# Patient Record
Sex: FEMALE | Race: WHITE | NOT HISPANIC OR LATINO | URBAN - METROPOLITAN AREA
[De-identification: names, ages, dates, MRNs, and addresses within clinical notes are randomized per-mention and may not be internally consistent; named-entity substitution may affect disease eponyms.]

---

## 2017-10-10 ENCOUNTER — APPOINTMENT (OUTPATIENT)
Dept: URBAN - METROPOLITAN AREA CLINIC 200 | Age: 62
Setting detail: DERMATOLOGY
End: 2017-10-15

## 2017-10-10 DIAGNOSIS — L57.8 OTHER SKIN CHANGES DUE TO CHRONIC EXPOSURE TO NONIONIZING RADIATION: ICD-10-CM

## 2017-10-10 DIAGNOSIS — L57.0 ACTINIC KERATOSIS: ICD-10-CM

## 2017-10-10 DIAGNOSIS — D22 MELANOCYTIC NEVI: ICD-10-CM

## 2017-10-10 PROBLEM — D22.5 MELANOCYTIC NEVI OF TRUNK: Status: ACTIVE | Noted: 2017-10-10

## 2017-10-10 PROBLEM — J30.1 ALLERGIC RHINITIS DUE TO POLLEN: Status: ACTIVE | Noted: 2017-10-10

## 2017-10-10 PROCEDURE — OTHER LIQUID NITROGEN: OTHER

## 2017-10-10 PROCEDURE — OTHER COUNSELING: OTHER

## 2017-10-10 PROCEDURE — 99202 OFFICE O/P NEW SF 15 MIN: CPT | Mod: 25

## 2017-10-10 PROCEDURE — 17000 DESTRUCT PREMALG LESION: CPT

## 2017-10-10 ASSESSMENT — LOCATION ZONE DERM: LOCATION ZONE: TRUNK

## 2017-10-10 ASSESSMENT — LOCATION SIMPLE DESCRIPTION DERM: LOCATION SIMPLE: CHEST

## 2017-10-10 ASSESSMENT — LOCATION DETAILED DESCRIPTION DERM
LOCATION DETAILED: UPPER STERNUM
LOCATION DETAILED: MIDDLE STERNUM

## 2017-10-10 NOTE — PROCEDURE: LIQUID NITROGEN
Consent: The patient's consent was obtained including but not limited to risks of crusting, scabbing, blistering, scarring, darker or lighter pigmentary change, recurrence, incomplete removal and infection.
Number Of Freeze-Thaw Cycles: 2 freeze-thaw cycles
Duration Of Freeze Thaw-Cycle (Seconds): 10
Render Post-Care Instructions In Note?: no
Detail Level: Detailed
Post-Care Instructions: I reviewed with the patient in detail post-care instructions. Patient is to wear sunprotection, and avoid picking at any of the treated lesions. Pt may apply Vaseline to crusted or scabbing areas.

## 2018-10-19 ENCOUNTER — APPOINTMENT (OUTPATIENT)
Dept: URBAN - METROPOLITAN AREA CLINIC 200 | Age: 63
Setting detail: DERMATOLOGY
End: 2018-10-19

## 2018-10-19 ENCOUNTER — RX ONLY (RX ONLY)
Age: 63
End: 2018-10-19

## 2018-10-19 DIAGNOSIS — L57.8 OTHER SKIN CHANGES DUE TO CHRONIC EXPOSURE TO NONIONIZING RADIATION: ICD-10-CM

## 2018-10-19 DIAGNOSIS — L20.89 OTHER ATOPIC DERMATITIS: ICD-10-CM

## 2018-10-19 PROCEDURE — OTHER COUNSELING: OTHER

## 2018-10-19 PROCEDURE — OTHER MIPS QUALITY: OTHER

## 2018-10-19 PROCEDURE — 99213 OFFICE O/P EST LOW 20 MIN: CPT

## 2018-10-19 PROCEDURE — OTHER PRESCRIPTION: OTHER

## 2018-10-19 RX ORDER — TRIAMCINOLONE ACETONIDE 1 MG/G
CREAM TOPICAL
Qty: 1 | Refills: 5 | Status: ERX

## 2018-10-19 ASSESSMENT — LOCATION DETAILED DESCRIPTION DERM
LOCATION DETAILED: RIGHT ANTERIOR SHOULDER
LOCATION DETAILED: LEFT MEDIAL SUPERIOR CHEST

## 2018-10-19 ASSESSMENT — LOCATION SIMPLE DESCRIPTION DERM
LOCATION SIMPLE: RIGHT SHOULDER
LOCATION SIMPLE: CHEST

## 2018-10-19 ASSESSMENT — LOCATION ZONE DERM
LOCATION ZONE: ARM
LOCATION ZONE: TRUNK

## 2018-10-19 NOTE — PROCEDURE: MIPS QUALITY
Additional Notes: Patient has not received flu shot, but plans to.
Detail Level: Detailed
Quality 110: Preventive Care And Screening: Influenza Immunization: Influenza Immunization not Administered for Documented Reasons.

## 2019-06-07 ENCOUNTER — APPOINTMENT (EMERGENCY)
Dept: RADIOLOGY | Facility: HOSPITAL | Age: 64
End: 2019-06-07
Attending: EMERGENCY MEDICINE
Payer: COMMERCIAL

## 2019-06-07 ENCOUNTER — HOSPITAL ENCOUNTER (EMERGENCY)
Facility: HOSPITAL | Age: 64
Discharge: HOME | End: 2019-06-07
Attending: EMERGENCY MEDICINE
Payer: COMMERCIAL

## 2019-06-07 VITALS
SYSTOLIC BLOOD PRESSURE: 151 MMHG | TEMPERATURE: 98.5 F | WEIGHT: 205 LBS | RESPIRATION RATE: 16 BRPM | HEART RATE: 71 BPM | DIASTOLIC BLOOD PRESSURE: 87 MMHG | HEIGHT: 64 IN | BODY MASS INDEX: 35 KG/M2 | OXYGEN SATURATION: 99 %

## 2019-06-07 DIAGNOSIS — S09.90XA CLOSED HEAD INJURY WITHOUT LOSS OF CONSCIOUSNESS, INITIAL ENCOUNTER: Primary | ICD-10-CM

## 2019-06-07 DIAGNOSIS — S00.10XA PERIORBITAL ECCHYMOSIS, UNSPECIFIED LATERALITY, INITIAL ENCOUNTER: ICD-10-CM

## 2019-06-07 DIAGNOSIS — S00.83XA TRAUMATIC HEMATOMA OF FOREHEAD, INITIAL ENCOUNTER: ICD-10-CM

## 2019-06-07 PROCEDURE — 99284 EMERGENCY DEPT VISIT MOD MDM: CPT | Mod: 25

## 2019-06-07 PROCEDURE — 70450 CT HEAD/BRAIN W/O DYE: CPT

## 2019-06-07 RX ORDER — NAPROXEN SODIUM 220 MG/1
81 TABLET, FILM COATED ORAL DAILY
COMMUNITY

## 2019-06-07 RX ORDER — HYDROGEN PEROXIDE 3 %
20 SOLUTION, NON-ORAL MISCELLANEOUS
COMMUNITY

## 2019-06-07 RX ORDER — MONTELUKAST SODIUM 10 MG/1
TABLET ORAL NIGHTLY
COMMUNITY

## 2019-06-07 RX ORDER — ALBUTEROL SULFATE 90 UG/1
2 INHALANT RESPIRATORY (INHALATION) EVERY 4 HOURS PRN
Refills: 6 | COMMUNITY
Start: 2019-03-13

## 2019-06-07 RX ORDER — LUBIPROSTONE 24 UG/1
CAPSULE ORAL
COMMUNITY
Start: 2007-04-23

## 2019-06-07 RX ORDER — HYDROCHLOROTHIAZIDE 12.5 MG/1
12.5 CAPSULE ORAL DAILY
Status: ON HOLD | COMMUNITY
End: 2024-04-19

## 2019-06-07 RX ORDER — CEVIMELINE HYDROCHLORIDE 30 MG/1
30 CAPSULE ORAL 2 TIMES DAILY
COMMUNITY

## 2019-06-07 RX ORDER — METOPROLOL TARTRATE 25 MG/1
75 TABLET, FILM COATED ORAL 2 TIMES DAILY
COMMUNITY

## 2019-06-07 RX ORDER — LEVOCETIRIZINE DIHYDROCHLORIDE 5 MG/1
5 TABLET, FILM COATED ORAL
COMMUNITY

## 2019-06-07 RX ORDER — INSULIN PUMP SYRINGE, 3 ML
EACH MISCELLANEOUS
COMMUNITY

## 2019-06-07 RX ORDER — TRAMADOL HYDROCHLORIDE 50 MG/1
50 TABLET ORAL EVERY 6 HOURS PRN
Status: ON HOLD | COMMUNITY
End: 2024-04-19

## 2019-06-07 RX ORDER — SIMVASTATIN 40 MG/1
40 TABLET, FILM COATED ORAL NIGHTLY
COMMUNITY

## 2019-06-07 RX ORDER — FLUOXETINE 10 MG/1
60 CAPSULE ORAL DAILY
COMMUNITY

## 2019-06-07 RX ORDER — HYDROXYCHLOROQUINE SULFATE 200 MG/1
TABLET, FILM COATED ORAL DAILY
Status: ON HOLD | COMMUNITY
End: 2024-04-19

## 2019-06-07 ASSESSMENT — ENCOUNTER SYMPTOMS
NUMBNESS: 0
SHORTNESS OF BREATH: 0
NECK PAIN: 0
FEVER: 0
VOMITING: 0
HEADACHES: 0
EYE PAIN: 0
DIZZINESS: 0
WEAKNESS: 0

## 2019-06-07 NOTE — ED ATTESTATION NOTE
The patient was evaluated and managed by the physician assistant / nurse practitioner.       Eobny Desouza, DO  06/07/19 1285

## 2019-06-07 NOTE — DISCHARGE INSTRUCTIONS
Ice area if able. The bruising may continue to spread over the next week. Return to the  ER for severe pain, vision changes, vomiting, pass out, difficulty walking or talking, or any other concerning symptoms.

## 2019-06-07 NOTE — ED PROVIDER NOTES
"HPI     Chief Complaint   Patient presents with   • Fall           ** 65 y/o female presents for evaluation of periorbital ecchymosis s/p fall that occurred 2 days ago. Two days ago, patient was preparing the house for a move when she tripped over a box, falling and landing onto face. No LOC, was able to get up immediately. States she immediately had a hematoma to her forehead. States yesterday, her lower forehead was \"puffy\". Awoke this morning with significant bruising around both eyes, prompting her to come to ED. Denies headache (other than soreness at area of hematoma), neck pain, vision changes, periorbital pain, vomiting, dizziness, paresthesias, or weakness. Patient is on baby ASA daily. **             Patient History     Past Medical History:   Diagnosis Date   • Asthma    • Fibromyalgia    • GERD (gastroesophageal reflux disease)    • Hypertension    • Osteoarthritis    • Scleroma        Past Surgical History:   Procedure Laterality Date   • BACK SURGERY     • CATARACT EXTRACTION      b/l   • NECK SURGERY      bar in neck   • REPLACEMENT TOTAL KNEE     • ROTATOR CUFF REPAIR      b/l       History reviewed. No pertinent family history.    Social History   Substance Use Topics   • Smoking status: Never Smoker   • Smokeless tobacco: Never Used   • Alcohol use No       Systems Reviewed from Nursing Triage:          Review of Systems     Review of Systems   Constitutional: Negative for fever.   Eyes: Negative for pain and visual disturbance.   Respiratory: Negative for shortness of breath.    Cardiovascular: Negative for chest pain.   Gastrointestinal: Negative for vomiting.   Musculoskeletal: Negative for neck pain.   Neurological: Negative for dizziness, syncope, weakness, numbness and headaches.        Physical Exam     ED Triage Vitals [06/07/19 1353]   Temp Heart Rate Resp BP SpO2   36.9 °C (98.5 °F) 71 18 (!) 147/62 100 %      Temp Source Heart Rate Source Patient Position BP Location FiO2 (%) (Set) " "  Oral -- Sitting Right upper arm --                     Patient Vitals for the past 24 hrs:   BP Temp Temp src Pulse Resp SpO2 Height Weight   06/07/19 1353 (!) 147/62 36.9 °C (98.5 °F) Oral 71 18 100 % 1.626 m (5' 4\") 93 kg (205 lb)           Physical Exam   Constitutional: She is oriented to person, place, and time. She appears well-developed and well-nourished.   HENT:   Head: Normocephalic.   Large left frontal hematoma  Periorbital hematoma/ecchymosis bilaterally diffusely  No facial bone tenderness   Eyes: Pupils are equal, round, and reactive to light. Conjunctivae and EOM are normal.   Neck: Neck supple.   No tenderness   Cardiovascular: Normal rate, regular rhythm and normal heart sounds.    Pulmonary/Chest: Effort normal and breath sounds normal.   Neurological: She is alert and oriented to person, place, and time.   No sensory or motor deficits   Skin: Skin is warm and dry.   Psychiatric: She has a normal mood and affect.   Nursing note and vitals reviewed.           Procedures    ED Course & MDM     Labs Reviewed - No data to display    CT HEAD WITHOUT IV CONTRAST   Final Result   IMPRESSION:         1.  No acute intracranial abnormality.   2.  Frontal scalp hematoma.                  MDM         ED Course as of Jun 07 1452 Fri Jun 07, 2019   1405 Normal VS. Well-appearing. Will check CT head to r/o intracranial injury. I do not suspect C-spine or facial bone fractures.  [TT]   1451 Patient doing well. No intracranial injury seen on CT. Will recommend ice and close monitoring. Will recommend close follow-up with PCP.  [TT]      ED Course User Index  [TT] Yulissa Louis PA C         Clinical Impressions as of Jun 07 1452   Closed head injury without loss of consciousness, initial encounter   Traumatic hematoma of forehead, initial encounter   Periorbital ecchymosis, unspecified laterality, initial encounter - bilateral        Yulissa Louis PA C  06/07/19 1452    "

## 2019-10-23 ENCOUNTER — APPOINTMENT (OUTPATIENT)
Dept: URBAN - METROPOLITAN AREA CLINIC 200 | Age: 64
Setting detail: DERMATOLOGY
End: 2019-10-25

## 2019-10-23 DIAGNOSIS — L57.8 OTHER SKIN CHANGES DUE TO CHRONIC EXPOSURE TO NONIONIZING RADIATION: ICD-10-CM

## 2019-10-23 PROBLEM — D23.5 OTHER BENIGN NEOPLASM OF SKIN OF TRUNK: Status: ACTIVE | Noted: 2019-10-23

## 2019-10-23 PROCEDURE — OTHER MIPS QUALITY: OTHER

## 2019-10-23 PROCEDURE — OTHER COUNSELING: OTHER

## 2019-10-23 PROCEDURE — 99213 OFFICE O/P EST LOW 20 MIN: CPT

## 2019-10-23 ASSESSMENT — LOCATION DETAILED DESCRIPTION DERM: LOCATION DETAILED: LEFT MEDIAL SUPERIOR CHEST

## 2019-10-23 ASSESSMENT — LOCATION SIMPLE DESCRIPTION DERM: LOCATION SIMPLE: CHEST

## 2019-10-23 ASSESSMENT — LOCATION ZONE DERM: LOCATION ZONE: TRUNK

## 2019-10-23 NOTE — PROCEDURE: MIPS QUALITY
Detail Level: Detailed
Quality 474: Zoster Vaccination Status: Shingrix Vaccination Administered or Previously Received
Quality 110: Preventive Care And Screening: Influenza Immunization: Influenza Immunization previously received during influenza season

## 2021-05-06 ENCOUNTER — APPOINTMENT (OUTPATIENT)
Dept: URBAN - METROPOLITAN AREA CLINIC 200 | Age: 66
Setting detail: DERMATOLOGY
End: 2021-05-06

## 2021-05-06 ENCOUNTER — APPOINTMENT (OUTPATIENT)
Dept: URBAN - METROPOLITAN AREA CLINIC 200 | Age: 66
Setting detail: DERMATOLOGY
End: 2021-05-13

## 2021-05-06 DIAGNOSIS — L57.0 ACTINIC KERATOSIS: ICD-10-CM

## 2021-05-06 DIAGNOSIS — L82.1 OTHER SEBORRHEIC KERATOSIS: ICD-10-CM

## 2021-05-06 DIAGNOSIS — L57.8 OTHER SKIN CHANGES DUE TO CHRONIC EXPOSURE TO NONIONIZING RADIATION: ICD-10-CM

## 2021-05-06 PROCEDURE — OTHER REASSURANCE: OTHER

## 2021-05-06 PROCEDURE — OTHER COUNSELING: OTHER

## 2021-05-06 PROCEDURE — 17000 DESTRUCT PREMALG LESION: CPT

## 2021-05-06 PROCEDURE — OTHER LIQUID NITROGEN: OTHER

## 2021-05-06 PROCEDURE — 99212 OFFICE O/P EST SF 10 MIN: CPT | Mod: 25

## 2021-05-06 ASSESSMENT — LOCATION ZONE DERM
LOCATION ZONE: TRUNK
LOCATION ZONE: FACE
LOCATION ZONE: ARM

## 2021-05-06 ASSESSMENT — LOCATION SIMPLE DESCRIPTION DERM
LOCATION SIMPLE: CHEST
LOCATION SIMPLE: LEFT FOREHEAD
LOCATION SIMPLE: RIGHT FOREARM

## 2021-05-06 ASSESSMENT — LOCATION DETAILED DESCRIPTION DERM
LOCATION DETAILED: LEFT MEDIAL FOREHEAD
LOCATION DETAILED: RIGHT DISTAL DORSAL FOREARM
LOCATION DETAILED: LEFT MEDIAL SUPERIOR CHEST

## 2021-05-06 ASSESSMENT — PAIN INTENSITY VAS: HOW INTENSE IS YOUR PAIN 0 BEING NO PAIN, 10 BEING THE MOST SEVERE PAIN POSSIBLE?: NO PAIN

## 2021-05-06 NOTE — PROCEDURE: LIQUID NITROGEN
Post-Care Instructions: I reviewed with the patient in detail post-care instructions. Patient is to wear sunprotection, and avoid picking at any of the treated lesions. Pt may apply Vaseline to crusted or scabbing areas.
Detail Level: Detailed
Duration Of Freeze Thaw-Cycle (Seconds): 2
Render Note In Bullet Format When Appropriate: No
Number Of Freeze-Thaw Cycles: 1 freeze-thaw cycle
Consent: The patient's consent was obtained including but not limited to risks of crusting, scabbing, blistering, scarring, darker or lighter pigmentary change, recurrence, incomplete removal and infection.

## 2022-06-13 ENCOUNTER — APPOINTMENT (OUTPATIENT)
Dept: URBAN - METROPOLITAN AREA CLINIC 200 | Age: 67
Setting detail: DERMATOLOGY
End: 2022-06-20

## 2022-06-13 DIAGNOSIS — D485 NEOPLASM OF UNCERTAIN BEHAVIOR OF SKIN: ICD-10-CM

## 2022-06-13 DIAGNOSIS — Z11.52 ENCOUNTER FOR SCREENING FOR COVID-19: ICD-10-CM

## 2022-06-13 DIAGNOSIS — L57.8 OTHER SKIN CHANGES DUE TO CHRONIC EXPOSURE TO NONIONIZING RADIATION: ICD-10-CM

## 2022-06-13 DIAGNOSIS — L82.1 OTHER SEBORRHEIC KERATOSIS: ICD-10-CM

## 2022-06-13 DIAGNOSIS — R20.2 PARESTHESIA OF SKIN: ICD-10-CM

## 2022-06-13 PROBLEM — D48.5 NEOPLASM OF UNCERTAIN BEHAVIOR OF SKIN: Status: ACTIVE | Noted: 2022-06-13

## 2022-06-13 PROCEDURE — OTHER SUNSCREEN RECOMMENDATIONS: OTHER

## 2022-06-13 PROCEDURE — OTHER COUNSELING: OTHER

## 2022-06-13 PROCEDURE — OTHER SCREENING FOR COVID-19: OTHER

## 2022-06-13 PROCEDURE — 11102 TANGNTL BX SKIN SINGLE LES: CPT

## 2022-06-13 PROCEDURE — OTHER BIOPSY BY SHAVE METHOD: OTHER

## 2022-06-13 PROCEDURE — 99213 OFFICE O/P EST LOW 20 MIN: CPT | Mod: 25

## 2022-06-13 PROCEDURE — OTHER REASSURANCE: OTHER

## 2022-06-13 ASSESSMENT — LOCATION ZONE DERM
LOCATION ZONE: ARM
LOCATION ZONE: TRUNK
LOCATION ZONE: FACE

## 2022-06-13 ASSESSMENT — PAIN INTENSITY VAS: HOW INTENSE IS YOUR PAIN 0 BEING NO PAIN, 10 BEING THE MOST SEVERE PAIN POSSIBLE?: NO PAIN

## 2022-06-13 ASSESSMENT — LOCATION SIMPLE DESCRIPTION DERM
LOCATION SIMPLE: RIGHT FOREARM
LOCATION SIMPLE: LEFT BREAST
LOCATION SIMPLE: ABDOMEN
LOCATION SIMPLE: LEFT FOREHEAD
LOCATION SIMPLE: RIGHT UPPER BACK

## 2022-06-13 ASSESSMENT — LOCATION DETAILED DESCRIPTION DERM
LOCATION DETAILED: EPIGASTRIC SKIN
LOCATION DETAILED: RIGHT SUPERIOR UPPER BACK
LOCATION DETAILED: RIGHT PROXIMAL DORSAL FOREARM
LOCATION DETAILED: LEFT MEDIAL BREAST 10-11:00 REGION
LOCATION DETAILED: LEFT MEDIAL BREAST 11-12:00 REGION
LOCATION DETAILED: LEFT MEDIAL FOREHEAD

## 2022-06-27 ENCOUNTER — APPOINTMENT (OUTPATIENT)
Dept: URBAN - METROPOLITAN AREA CLINIC 203 | Age: 67
Setting detail: DERMATOLOGY
End: 2022-06-27

## 2022-06-27 DIAGNOSIS — L57.0 ACTINIC KERATOSIS: ICD-10-CM

## 2022-06-27 DIAGNOSIS — Z11.52 ENCOUNTER FOR SCREENING FOR COVID-19: ICD-10-CM

## 2022-06-27 PROCEDURE — OTHER LIQUID NITROGEN: OTHER

## 2022-06-27 PROCEDURE — OTHER SCREENING FOR COVID-19: OTHER

## 2022-06-27 PROCEDURE — 17000 DESTRUCT PREMALG LESION: CPT

## 2022-06-27 ASSESSMENT — LOCATION DETAILED DESCRIPTION DERM
LOCATION DETAILED: LEFT MEDIAL FOREHEAD
LOCATION DETAILED: EPIGASTRIC SKIN

## 2022-06-27 ASSESSMENT — LOCATION SIMPLE DESCRIPTION DERM
LOCATION SIMPLE: ABDOMEN
LOCATION SIMPLE: LEFT FOREHEAD

## 2022-06-27 ASSESSMENT — LOCATION ZONE DERM
LOCATION ZONE: FACE
LOCATION ZONE: TRUNK

## 2022-06-27 NOTE — PROCEDURE: LIQUID NITROGEN
Consent: The patient's consent was obtained including but not limited to risks of crusting, scabbing, blistering, scarring, darker or lighter pigmentary change, recurrence, incomplete removal and infection.
Detail Level: Detailed
Show Applicator Variable?: Yes
Duration Of Freeze Thaw-Cycle (Seconds): 0
Render Note In Bullet Format When Appropriate: No
Post-Care Instructions: I reviewed with the patient in detail post-care instructions. Patient is to wear sunprotection, and avoid picking at any of the treated lesions. Pt may apply Vaseline to crusted or scabbing areas.

## 2023-03-31 ENCOUNTER — TRANSCRIBE ORDERS (OUTPATIENT)
Dept: SCHEDULING | Age: 68
End: 2023-03-31

## 2023-03-31 DIAGNOSIS — I35.8 OTHER NONRHEUMATIC AORTIC VALVE DISORDERS: ICD-10-CM

## 2023-03-31 DIAGNOSIS — I10 ESSENTIAL (PRIMARY) HYPERTENSION: ICD-10-CM

## 2023-03-31 DIAGNOSIS — Z01.818 ENCOUNTER FOR OTHER PREPROCEDURAL EXAMINATION: Primary | ICD-10-CM

## 2023-03-31 DIAGNOSIS — E66.01 MORBID (SEVERE) OBESITY DUE TO EXCESS CALORIES (CMS/HCC): ICD-10-CM

## 2023-03-31 DIAGNOSIS — E78.5 HYPERLIPIDEMIA, UNSPECIFIED: ICD-10-CM

## 2023-03-31 DIAGNOSIS — I25.10 ATHEROSCLEROTIC HEART DISEASE OF NATIVE CORONARY ARTERY WITHOUT ANGINA PECTORIS: ICD-10-CM

## 2023-03-31 DIAGNOSIS — I35.0 NONRHEUMATIC AORTIC (VALVE) STENOSIS: ICD-10-CM

## 2023-04-05 ENCOUNTER — TRANSCRIBE ORDERS (OUTPATIENT)
Dept: SCHEDULING | Age: 68
End: 2023-04-05

## 2023-04-05 DIAGNOSIS — R06.09 OTHER FORMS OF DYSPNEA: Primary | ICD-10-CM

## 2023-04-05 DIAGNOSIS — I25.10 ATHEROSCLEROTIC HEART DISEASE OF NATIVE CORONARY ARTERY WITHOUT ANGINA PECTORIS: ICD-10-CM

## 2023-04-05 DIAGNOSIS — E78.5 HYPERLIPIDEMIA, UNSPECIFIED: ICD-10-CM

## 2023-04-05 DIAGNOSIS — I10 ESSENTIAL (PRIMARY) HYPERTENSION: ICD-10-CM

## 2023-04-05 DIAGNOSIS — R94.39 ABNORMAL RESULT OF OTHER CARDIOVASCULAR FUNCTION STUDY: ICD-10-CM

## 2023-04-05 DIAGNOSIS — Z01.818 ENCOUNTER FOR OTHER PREPROCEDURAL EXAMINATION: ICD-10-CM

## 2023-04-05 DIAGNOSIS — R94.31 ABNORMAL ELECTROCARDIOGRAM (ECG) (EKG): ICD-10-CM

## 2023-04-05 DIAGNOSIS — E66.01 MORBID (SEVERE) OBESITY DUE TO EXCESS CALORIES (CMS/HCC): ICD-10-CM

## 2023-05-28 NOTE — PROCEDURE: BIOPSY BY SHAVE METHOD
Resume antihypertensives  Diabetic diet  Telemetry  Cardiology following   Type Of Destruction Used: Curettage

## 2023-06-13 ENCOUNTER — APPOINTMENT (OUTPATIENT)
Dept: URBAN - METROPOLITAN AREA CLINIC 203 | Age: 68
Setting detail: DERMATOLOGY
End: 2023-06-21

## 2023-06-13 DIAGNOSIS — L57.8 OTHER SKIN CHANGES DUE TO CHRONIC EXPOSURE TO NONIONIZING RADIATION: ICD-10-CM

## 2023-06-13 DIAGNOSIS — L30.4 ERYTHEMA INTERTRIGO: ICD-10-CM

## 2023-06-13 PROCEDURE — OTHER PRESCRIPTION MEDICATION MANAGEMENT: OTHER

## 2023-06-13 PROCEDURE — OTHER COUNSELING: OTHER

## 2023-06-13 PROCEDURE — 99213 OFFICE O/P EST LOW 20 MIN: CPT

## 2023-06-13 PROCEDURE — OTHER SUNSCREEN RECOMMENDATIONS: OTHER

## 2023-06-13 PROCEDURE — OTHER PRESCRIPTION: OTHER

## 2023-06-13 RX ORDER — TRIAMCINOLONE ACETONIDE 1 MG/G
CREAM TOPICAL
Qty: 80 | Refills: 2 | Status: ERX | COMMUNITY
Start: 2023-06-13

## 2023-06-13 ASSESSMENT — LOCATION ZONE DERM
LOCATION ZONE: LEG
LOCATION ZONE: TRUNK

## 2023-06-13 ASSESSMENT — LOCATION SIMPLE DESCRIPTION DERM
LOCATION SIMPLE: LEFT BREAST
LOCATION SIMPLE: LEFT THIGH
LOCATION SIMPLE: GROIN

## 2023-06-13 ASSESSMENT — LOCATION DETAILED DESCRIPTION DERM
LOCATION DETAILED: LEFT MEDIAL BREAST 10-11:00 REGION
LOCATION DETAILED: LEFT SUPRAPUBIC SKIN
LOCATION DETAILED: LEFT MEDIAL BREAST 11-12:00 REGION
LOCATION DETAILED: LEFT ANTERIOR PROXIMAL THIGH

## 2023-06-13 NOTE — PROCEDURE: PRESCRIPTION MEDICATION MANAGEMENT
Detail Level: Zone
Initiate Treatment: Nystatin-pt already has from pcp\\nTriamcinolone cream
Render In Strict Bullet Format?: No

## 2024-04-03 ENCOUNTER — APPOINTMENT (OUTPATIENT)
Dept: URBAN - METROPOLITAN AREA CLINIC 203 | Age: 69
Setting detail: DERMATOLOGY
End: 2024-04-03

## 2024-04-03 DIAGNOSIS — L57.8 OTHER SKIN CHANGES DUE TO CHRONIC EXPOSURE TO NONIONIZING RADIATION: ICD-10-CM

## 2024-04-03 PROCEDURE — OTHER SUNSCREEN RECOMMENDATIONS: OTHER

## 2024-04-03 PROCEDURE — 99213 OFFICE O/P EST LOW 20 MIN: CPT

## 2024-04-03 PROCEDURE — OTHER COUNSELING: OTHER

## 2024-04-03 ASSESSMENT — LOCATION DETAILED DESCRIPTION DERM
LOCATION DETAILED: LEFT MEDIAL BREAST 10-11:00 REGION
LOCATION DETAILED: LEFT MEDIAL BREAST 11-12:00 REGION

## 2024-04-03 ASSESSMENT — LOCATION ZONE DERM: LOCATION ZONE: TRUNK

## 2024-04-03 ASSESSMENT — LOCATION SIMPLE DESCRIPTION DERM: LOCATION SIMPLE: LEFT BREAST

## 2024-04-09 ENCOUNTER — ANESTHESIA EVENT (OUTPATIENT)
Dept: OPERATING ROOM | Facility: HOSPITAL | Age: 69
Setting detail: SURGERY ADMIT
DRG: 472 | End: 2024-04-09
Payer: MEDICARE

## 2024-04-11 ENCOUNTER — APPOINTMENT (OUTPATIENT)
Dept: LAB | Facility: HOSPITAL | Age: 69
End: 2024-04-11
Attending: ORTHOPAEDIC SURGERY
Payer: MEDICARE

## 2024-04-11 ENCOUNTER — TRANSCRIBE ORDERS (OUTPATIENT)
Dept: REGISTRATION | Facility: HOSPITAL | Age: 69
End: 2024-04-11

## 2024-04-11 DIAGNOSIS — Z01.812 ENCOUNTER FOR PREPROCEDURAL LABORATORY EXAMINATION: Primary | ICD-10-CM

## 2024-04-11 DIAGNOSIS — Z01.812 ENCOUNTER FOR PREPROCEDURAL LABORATORY EXAMINATION: ICD-10-CM

## 2024-04-11 LAB
ABO + RH BLD: NORMAL
ANION GAP SERPL CALC-SCNC: 6 MEQ/L (ref 3–15)
APTT PPP: 28 SEC (ref 23–35)
BASOPHILS # BLD: 0.07 K/UL (ref 0.01–0.1)
BASOPHILS NFR BLD: 0.7 %
BLD GP AB SCN SERPL QL: NEGATIVE
BUN SERPL-MCNC: 9 MG/DL (ref 7–25)
CALCIUM SERPL-MCNC: 8.8 MG/DL (ref 8.6–10.3)
CHLORIDE SERPL-SCNC: 100 MEQ/L (ref 98–107)
CO2 SERPL-SCNC: 31 MEQ/L (ref 21–31)
CREAT SERPL-MCNC: 0.7 MG/DL (ref 0.6–1.2)
D AG BLD QL: POSITIVE
DIFFERENTIAL METHOD BLD: NORMAL
EGFRCR SERPLBLD CKD-EPI 2021: >60 ML/MIN/1.73M*2
EOSINOPHIL # BLD: 0.24 K/UL (ref 0.04–0.36)
EOSINOPHIL NFR BLD: 2.4 %
ERYTHROCYTE [DISTWIDTH] IN BLOOD BY AUTOMATED COUNT: 13.6 % (ref 11.7–14.4)
EST. AVERAGE GLUCOSE BLD GHB EST-MCNC: 114 MG/DL
GLUCOSE SERPL-MCNC: 93 MG/DL (ref 70–99)
HBA1C MFR BLD: 5.6 %
HCT VFR BLD AUTO: 37.8 % (ref 35–45)
HGB BLD-MCNC: 12.3 G/DL (ref 11.8–15.7)
IMM GRANULOCYTES # BLD AUTO: 0.06 K/UL (ref 0–0.08)
IMM GRANULOCYTES NFR BLD AUTO: 0.6 %
INR PPP: 1
LABORATORY COMMENT REPORT: NORMAL
LYMPHOCYTES # BLD: 2.07 K/UL (ref 1.2–3.5)
LYMPHOCYTES NFR BLD: 21.1 %
MCH RBC QN AUTO: 28.3 PG (ref 28–33.2)
MCHC RBC AUTO-ENTMCNC: 32.5 G/DL (ref 32.2–35.5)
MCV RBC AUTO: 87.1 FL (ref 83–98)
MONOCYTES # BLD: 0.76 K/UL (ref 0.28–0.8)
MONOCYTES NFR BLD: 7.7 %
NEUTROPHILS # BLD: 6.62 K/UL (ref 1.7–7)
NEUTS SEG NFR BLD: 67.5 %
NRBC BLD-RTO: 0 %
PDW BLD AUTO: 10 FL (ref 9.4–12.3)
PLATELET # BLD AUTO: 340 K/UL (ref 150–369)
POTASSIUM SERPL-SCNC: 4.6 MEQ/L (ref 3.5–5.1)
PROTHROMBIN TIME: 12.7 SEC (ref 12.2–14.5)
RBC # BLD AUTO: 4.34 M/UL (ref 3.93–5.22)
SODIUM SERPL-SCNC: 137 MEQ/L (ref 136–145)
SPECIMEN EXP DATE BLD: NORMAL
WBC # BLD AUTO: 9.82 K/UL (ref 3.8–10.5)

## 2024-04-11 PROCEDURE — 85025 COMPLETE CBC W/AUTO DIFF WBC: CPT | Mod: GA

## 2024-04-11 PROCEDURE — 80048 BASIC METABOLIC PNL TOTAL CA: CPT

## 2024-04-11 PROCEDURE — 85610 PROTHROMBIN TIME: CPT | Mod: GA

## 2024-04-11 PROCEDURE — 36415 COLL VENOUS BLD VENIPUNCTURE: CPT

## 2024-04-11 PROCEDURE — 85730 THROMBOPLASTIN TIME PARTIAL: CPT | Mod: GA

## 2024-04-11 PROCEDURE — 86850 RBC ANTIBODY SCREEN: CPT

## 2024-04-11 PROCEDURE — 83036 HEMOGLOBIN GLYCOSYLATED A1C: CPT | Mod: GA

## 2024-04-11 PROCEDURE — 86901 BLOOD TYPING SEROLOGIC RH(D): CPT

## 2024-04-16 ENCOUNTER — PRE-ADMISSION TESTING (OUTPATIENT)
Dept: PREADMISSION TESTING | Age: 69
End: 2024-04-16
Payer: MEDICARE

## 2024-04-16 VITALS — BODY MASS INDEX: 42.33 KG/M2 | WEIGHT: 230 LBS | HEIGHT: 62 IN

## 2024-04-16 RX ORDER — AZELASTINE 1 MG/ML
137 SPRAY, METERED NASAL 2 TIMES DAILY
COMMUNITY
Start: 2023-05-11

## 2024-04-16 RX ORDER — TRAZODONE HYDROCHLORIDE 50 MG/1
TABLET ORAL
COMMUNITY
Start: 2023-06-06

## 2024-04-16 RX ORDER — SITAGLIPTIN 50 MG/1
50 TABLET, FILM COATED ORAL NIGHTLY
COMMUNITY
Start: 2023-11-28

## 2024-04-16 RX ORDER — IPRATROPIUM BROMIDE 42 UG/1
2 SPRAY, METERED NASAL 2 TIMES DAILY
COMMUNITY
Start: 2023-07-03

## 2024-04-16 RX ORDER — SOLIFENACIN SUCCINATE 10 MG/1
10 TABLET, FILM COATED ORAL
COMMUNITY
Start: 2023-06-06

## 2024-04-16 RX ORDER — FLUTICASONE FUROATE AND VILANTEROL 100; 25 UG/1; UG/1
POWDER RESPIRATORY (INHALATION)
COMMUNITY
Start: 2023-10-06

## 2024-04-16 NOTE — PRE-PROCEDURE INSTRUCTIONS
1.       You will be called between 1pm-5pm one business day before your procedure to tell you where and when to report. If you do not receive a phone call by 6pm, please call the Operating Room at [phone].    2.        Please report to Surgery Registration on the day of your procedure. You can park in the [facility], enter the front doors of the new Pavilion, and take the [place] elevators to the second floor. Follow signs to Surgery Registration.       3. Please follow the following fasting guidelines:     No solid food EIGHT HOURS prior to arrival time on day of surgery.  6 ounces of clear liquids, meaning water or PLAIN black coffee WITHOUT any milk, cream, sugar, or sweetener are permitted up to TWO HOURS prior to arrival at the hospital.    4. Please take ONLY the following medications with a sip of water on the morning of your procedure: (populate names and/or NONE)  Nexium, prozac, metoprolol, bring in albuterol,breo ellipta, cevimeline, asteline, atrovent      5. Other Instructions: You may brush your teeth the morning of the procedure. Rinse and spit, do not swallow.  Bring a list of your medications with dosages.  Use surgical wash as directed.     6. If you develop a cold, cough, fever, rash, or other symptom prior to the day of the procedure, please report it to your physician immediately.    7. If you need to cancel the procedure for any reason, please contact your physician.    8. Make arrangements to have safe transportation home accompanied by a responsible adult. If you have not arranged safe transportation home, your surgery will be cancelled. Safe transportation may include private vehicle, ride-share service, taxi and public transportation when accompanied by a responsible adult who will assist you home. A responsible adult is someone known to you and does not include the taxi, ride-share or public transit drive transporting you.    9.  If it is medically necessary for you to have a longer  stay, you will be informed as soon as the decision is made.    10. Only bring essential items to the hospital.  Do not wear or bring anything of value to the hospital including jewelry of any kind, money, or wallet. Do not wear make-up or contact lenses.  DO NOT BRING MEDICATIONS FROM HOME unless instructed to do so. DO bring your hearing aids, glasses, and a case    11. No lotion, creams, powders, or oils on skin the morning of procedure     12. Dress in comfortable clothes.    13.  If instructed, please bring a copy of your Advanced Directive (Living Will/Durable Power of ) on the day of your procedure.     14. Ensuring your safety at all times is a very important part of our [facility] Culture of Safety. After having surgery and sedation, you are at risk for falling and balance issues. Although you may feel awake, the effects of the medication can last up to 24 hours after anesthesia. If you need to use the bathroom during your recovery period, nursing staff will escort you there and stay with you to ensure your safety.    15. Refrain from drinking alcohol and smoking cigarettes for 24 hours prior to surgery.    16. Shower with antibacterial soap (Dial) the night before and morning of your procedure.  If your procedure indicates the need for CHG antiseptic wash (Bactoshield or Hibiclens), please use this instead and follow instructions as discussed at the time of your Pre-Admission Testing phone interview or visit.    Above instructions reviewed with patient and patient acknowledges understanding.    Form explained by: Carissa Hutson RN

## 2024-04-16 NOTE — PRE-PROCEDURE INSTRUCTIONS
[facility]  Patient Education Preoperative Showers    Good hygiene, such as frequent handwashing and daily skin cleansing, promotes good health. Daily skin cleansing helps remove germs that may cause infections. The following instructions should be followed to help reduce germs on your skin prior to your surgical procedure.     Bactoshield/Hibiclens CHG 4% is an antiseptic soap. The active ingredient is chlorhexidine gluconate. Do not use this product if you are allergic to chlorhexidine gluconate.  The NIGHT before and the MORNING of your procedure, shower or bathe with Bactoshield. This product should replace your regular soap used for cleansing most of your body surfaces. Bactoshield should not be used on your head or face; keep out of your eyes, ears and mouth.  If you plan to wash your hair, do so with your regular shampoo. Then, rinse the hair and your body thoroughly to remove any shampoo residue.  Wash your face with regular soap and water only.  Wash your genital area with soap and water only.  Thoroughly rinse your body with warm water from the neck down.  Apply the minimum amount of Bactoshield to cover the skin. Use this product as you would any liquid soap. Leave this on for 2 minutes. NOTE- Bactoshield DOES NOT LATHER like normal soap.   Wash the skin gently and rinse thoroughly with warm water. You do not need to scrub the skin to remove germs.  Do not use regular soap after you have applied and rinsed the Bactoshield.  Change into clean clothes after each shower.   Do not apply any lotions, powders, or perfumes to the body areas that have been cleansed with Bactoshield.  No use of hair removal products or shaving at or near the surgical site 48 hours before your procedure. (72 hours for cardiac patients.)  For those having perineal surgeries (i.e.: vaginal, rectal or cystoscopy) - please use Dial soap.

## 2024-04-19 ENCOUNTER — ANESTHESIA (OUTPATIENT)
Dept: OPERATING ROOM | Facility: HOSPITAL | Age: 69
Setting detail: SURGERY ADMIT
DRG: 472 | End: 2024-04-19
Payer: MEDICARE

## 2024-04-19 ENCOUNTER — APPOINTMENT (INPATIENT)
Dept: RADIOLOGY | Facility: HOSPITAL | Age: 69
DRG: 472 | End: 2024-04-19
Attending: NURSE PRACTITIONER
Payer: MEDICARE

## 2024-04-19 ENCOUNTER — HOSPITAL ENCOUNTER (INPATIENT)
Facility: HOSPITAL | Age: 69
LOS: 3 days | Discharge: HOME | DRG: 472 | End: 2024-04-22
Attending: ORTHOPAEDIC SURGERY | Admitting: ORTHOPAEDIC SURGERY
Payer: MEDICARE

## 2024-04-19 ENCOUNTER — APPOINTMENT (OUTPATIENT)
Dept: RADIOLOGY | Facility: HOSPITAL | Age: 69
Setting detail: SURGERY ADMIT
DRG: 472 | End: 2024-04-19
Attending: ORTHOPAEDIC SURGERY
Payer: MEDICARE

## 2024-04-19 DIAGNOSIS — G99.2 STENOSIS OF CERVICAL SPINE WITH MYELOPATHY (CMS/HCC): Primary | ICD-10-CM

## 2024-04-19 DIAGNOSIS — M48.02 STENOSIS OF CERVICAL SPINE WITH MYELOPATHY (CMS/HCC): Primary | ICD-10-CM

## 2024-04-19 PROBLEM — I25.10 CAD (CORONARY ARTERY DISEASE): Status: ACTIVE | Noted: 2024-04-19

## 2024-04-19 PROBLEM — K21.9 GERD (GASTROESOPHAGEAL REFLUX DISEASE): Status: ACTIVE | Noted: 2024-04-19

## 2024-04-19 PROBLEM — I10 BENIGN ESSENTIAL HTN: Status: ACTIVE | Noted: 2024-04-19

## 2024-04-19 PROBLEM — J45.909 ASTHMA: Status: ACTIVE | Noted: 2024-04-19

## 2024-04-19 PROBLEM — N39.41 URGENCY INCONTINENCE: Status: ACTIVE | Noted: 2024-04-19

## 2024-04-19 PROBLEM — R73.03 PREDIABETES: Status: ACTIVE | Noted: 2024-04-19

## 2024-04-19 PROBLEM — R68.2 DRY MOUTH: Status: ACTIVE | Noted: 2024-04-19

## 2024-04-19 PROBLEM — M48.00 SPINAL STENOSIS: Status: ACTIVE | Noted: 2024-04-19

## 2024-04-19 PROBLEM — F39 MOOD DISORDER (CMS/HCC): Status: ACTIVE | Noted: 2024-04-19

## 2024-04-19 PROBLEM — K58.1 IRRITABLE BOWEL SYNDROME WITH CONSTIPATION: Status: ACTIVE | Noted: 2024-04-19

## 2024-04-19 PROBLEM — M35.00 SICCA SYNDROME (CMS/HCC): Status: ACTIVE | Noted: 2024-04-19

## 2024-04-19 LAB
ABO + RH BLD: NORMAL
BLD GP AB SCN SERPL QL: NEGATIVE
D AG BLD QL: POSITIVE
GLUCOSE BLD-MCNC: 100 MG/DL (ref 70–99)
GLUCOSE BLD-MCNC: 97 MG/DL (ref 70–99)
LABORATORY COMMENT REPORT: NORMAL
POCT TEST: ABNORMAL
POCT TEST: NORMAL
SPECIMEN EXP DATE BLD: NORMAL

## 2024-04-19 PROCEDURE — 25000000 HC PHARMACY GENERAL: Performed by: NURSE PRACTITIONER

## 2024-04-19 PROCEDURE — 25000000 HC PHARMACY GENERAL: Performed by: NURSE ANESTHETIST, CERTIFIED REGISTERED

## 2024-04-19 PROCEDURE — 27800000 HC SUPPLY/IMPLANTS: Performed by: ORTHOPAEDIC SURGERY

## 2024-04-19 PROCEDURE — 86891 AUTOLOGOUS BLOOD OP SALVAGE: CPT

## 2024-04-19 PROCEDURE — 99223 1ST HOSP IP/OBS HIGH 75: CPT | Performed by: STUDENT IN AN ORGANIZED HEALTH CARE EDUCATION/TRAINING PROGRAM

## 2024-04-19 PROCEDURE — 63600000 HC DRUGS/DETAIL CODE: Mod: JZ | Performed by: NURSE ANESTHETIST, CERTIFIED REGISTERED

## 2024-04-19 PROCEDURE — 0RB30ZZ EXCISION OF CERVICAL VERTEBRAL DISC, OPEN APPROACH: ICD-10-PCS | Performed by: ORTHOPAEDIC SURGERY

## 2024-04-19 PROCEDURE — 36000004 HC OR LEVEL 4 INITIAL 30MIN: Performed by: ORTHOPAEDIC SURGERY

## 2024-04-19 PROCEDURE — 63600000 HC DRUGS/DETAIL CODE: Mod: JZ | Performed by: NURSE PRACTITIONER

## 2024-04-19 PROCEDURE — 86850 RBC ANTIBODY SCREEN: CPT

## 2024-04-19 PROCEDURE — 25000000 HC PHARMACY GENERAL: Performed by: ORTHOPAEDIC SURGERY

## 2024-04-19 PROCEDURE — 12000000 HC ROOM AND CARE MED/SURG

## 2024-04-19 PROCEDURE — 37000001 HC ANESTHESIA GENERAL: Performed by: ORTHOPAEDIC SURGERY

## 2024-04-19 PROCEDURE — 72020 X-RAY EXAM OF SPINE 1 VIEW: CPT

## 2024-04-19 PROCEDURE — 63600000 HC DRUGS/DETAIL CODE: Performed by: NURSE PRACTITIONER

## 2024-04-19 PROCEDURE — 71000011 HC PACU PHASE 1 EA ADDL MIN: Performed by: ORTHOPAEDIC SURGERY

## 2024-04-19 PROCEDURE — 00NW0ZZ RELEASE CERVICAL SPINAL CORD, OPEN APPROACH: ICD-10-PCS | Performed by: ORTHOPAEDIC SURGERY

## 2024-04-19 PROCEDURE — 27200000 HC STERILE SUPPLY: Performed by: ORTHOPAEDIC SURGERY

## 2024-04-19 PROCEDURE — 63600000 HC DRUGS/DETAIL CODE: Mod: JG | Performed by: ANESTHESIOLOGY

## 2024-04-19 PROCEDURE — 0RG1071 FUSION OF CERVICAL VERTEBRAL JOINT WITH AUTOLOGOUS TISSUE SUBSTITUTE, POSTERIOR APPROACH, POSTERIOR COLUMN, OPEN APPROACH: ICD-10-PCS | Performed by: ORTHOPAEDIC SURGERY

## 2024-04-19 PROCEDURE — 63700000 HC SELF-ADMINISTRABLE DRUG: Performed by: NURSE PRACTITIONER

## 2024-04-19 PROCEDURE — 25800000 HC PHARMACY IV SOLUTIONS: Performed by: NURSE PRACTITIONER

## 2024-04-19 PROCEDURE — 36000014 HC OR LEVEL 4 EA ADDL MIN: Performed by: ORTHOPAEDIC SURGERY

## 2024-04-19 PROCEDURE — 71000001 HC PACU PHASE 1 INITIAL 30MIN: Performed by: ORTHOPAEDIC SURGERY

## 2024-04-19 PROCEDURE — 63600000 HC DRUGS/DETAIL CODE: Performed by: NURSE ANESTHETIST, CERTIFIED REGISTERED

## 2024-04-19 PROCEDURE — 63600000 HC DRUGS/DETAIL CODE: Mod: JG

## 2024-04-19 PROCEDURE — 63600000 HC DRUGS/DETAIL CODE: Mod: JZ | Performed by: ORTHOPAEDIC SURGERY

## 2024-04-19 PROCEDURE — C1713 ANCHOR/SCREW BN/BN,TIS/BN: HCPCS | Performed by: ORTHOPAEDIC SURGERY

## 2024-04-19 PROCEDURE — 36415 COLL VENOUS BLD VENIPUNCTURE: CPT | Performed by: NURSE PRACTITIONER

## 2024-04-19 DEVICE — IMPLANTABLE DEVICE: Type: IMPLANTABLE DEVICE | Site: SPINE CERVICAL | Status: FUNCTIONAL

## 2024-04-19 DEVICE — 4.0 PLY SCREW 3.5X14: Type: IMPLANTABLE DEVICE | Site: SPINE CERVICAL | Status: FUNCTIONAL

## 2024-04-19 DEVICE — SCREW 4.0 PLY 3.5X10: Type: IMPLANTABLE DEVICE | Site: SPINE CERVICAL | Status: FUNCTIONAL

## 2024-04-19 DEVICE — 4.0 PLY SCREW 3.5X12: Type: IMPLANTABLE DEVICE | Site: SPINE CERVICAL | Status: FUNCTIONAL

## 2024-04-19 DEVICE — 3 DEMIN STRIP 50 X 10CM: Type: IMPLANTABLE DEVICE | Site: SPINE CERVICAL | Status: FUNCTIONAL

## 2024-04-19 DEVICE — SET SCREW: Type: IMPLANTABLE DEVICE | Site: SPINE CERVICAL | Status: FUNCTIONAL

## 2024-04-19 RX ORDER — OXYCODONE HYDROCHLORIDE 5 MG/1
10-15 TABLET ORAL EVERY 4 HOURS PRN
Status: DISCONTINUED | OUTPATIENT
Start: 2024-04-19 | End: 2024-04-22 | Stop reason: HOSPADM

## 2024-04-19 RX ORDER — FENTANYL CITRATE 50 UG/ML
INJECTION, SOLUTION INTRAMUSCULAR; INTRAVENOUS AS NEEDED
Status: DISCONTINUED | OUTPATIENT
Start: 2024-04-19 | End: 2024-04-19 | Stop reason: SURG

## 2024-04-19 RX ORDER — SODIUM CHLORIDE, SODIUM LACTATE, POTASSIUM CHLORIDE, CALCIUM CHLORIDE 600; 310; 30; 20 MG/100ML; MG/100ML; MG/100ML; MG/100ML
INJECTION, SOLUTION INTRAVENOUS CONTINUOUS
Status: ACTIVE | OUTPATIENT
Start: 2024-04-19 | End: 2024-04-20

## 2024-04-19 RX ORDER — EPHEDRINE SULFATE 50 MG/ML
INJECTION, SOLUTION INTRAVENOUS AS NEEDED
Status: DISCONTINUED | OUTPATIENT
Start: 2024-04-19 | End: 2024-04-19 | Stop reason: SURG

## 2024-04-19 RX ORDER — DEXTROSE 40 %
15-30 GEL (GRAM) ORAL AS NEEDED
Status: DISCONTINUED | OUTPATIENT
Start: 2024-04-19 | End: 2024-04-21 | Stop reason: SDUPTHER

## 2024-04-19 RX ORDER — DEXTROSE 40 %
15-30 GEL (GRAM) ORAL AS NEEDED
Status: DISCONTINUED | OUTPATIENT
Start: 2024-04-19 | End: 2024-04-22 | Stop reason: HOSPADM

## 2024-04-19 RX ORDER — MONTELUKAST SODIUM 10 MG/1
10 TABLET ORAL NIGHTLY
Status: DISCONTINUED | OUTPATIENT
Start: 2024-04-19 | End: 2024-04-22 | Stop reason: HOSPADM

## 2024-04-19 RX ORDER — DEXTROSE 50 % IN WATER (D50W) INTRAVENOUS SYRINGE
25 AS NEEDED
Status: DISCONTINUED | OUTPATIENT
Start: 2024-04-19 | End: 2024-04-21 | Stop reason: SDUPTHER

## 2024-04-19 RX ORDER — HYDROMORPHONE HYDROCHLORIDE 1 MG/ML
0.5 INJECTION, SOLUTION INTRAMUSCULAR; INTRAVENOUS; SUBCUTANEOUS EVERY 4 HOURS PRN
Status: DISCONTINUED | OUTPATIENT
Start: 2024-04-19 | End: 2024-04-22 | Stop reason: HOSPADM

## 2024-04-19 RX ORDER — VANCOMYCIN HYDROCHLORIDE 1 G/20ML
INJECTION, POWDER, LYOPHILIZED, FOR SOLUTION INTRAVENOUS
Status: DISCONTINUED | OUTPATIENT
Start: 2024-04-19 | End: 2024-04-19 | Stop reason: HOSPADM

## 2024-04-19 RX ORDER — PHENYLEPHRINE HYDROCHLORIDE 10 MG/ML
INJECTION INTRAVENOUS AS NEEDED
Status: DISCONTINUED | OUTPATIENT
Start: 2024-04-19 | End: 2024-04-19 | Stop reason: SURG

## 2024-04-19 RX ORDER — IBUPROFEN 200 MG
16-32 TABLET ORAL AS NEEDED
Status: DISCONTINUED | OUTPATIENT
Start: 2024-04-19 | End: 2024-04-21 | Stop reason: SDUPTHER

## 2024-04-19 RX ORDER — DIPHENHYDRAMINE HCL 50 MG/ML
12.5 VIAL (ML) INJECTION
Status: DISCONTINUED | OUTPATIENT
Start: 2024-04-19 | End: 2024-04-19 | Stop reason: HOSPADM

## 2024-04-19 RX ORDER — ALBUTEROL SULFATE 90 UG/1
2 INHALANT RESPIRATORY (INHALATION) EVERY 4 HOURS PRN
Status: DISCONTINUED | OUTPATIENT
Start: 2024-04-19 | End: 2024-04-22 | Stop reason: HOSPADM

## 2024-04-19 RX ORDER — HYDROMORPHONE HYDROCHLORIDE 1 MG/ML
0.5 INJECTION, SOLUTION INTRAMUSCULAR; INTRAVENOUS; SUBCUTANEOUS
Status: DISCONTINUED | OUTPATIENT
Start: 2024-04-19 | End: 2024-04-19 | Stop reason: HOSPADM

## 2024-04-19 RX ORDER — BISACODYL 10 MG/1
10 SUPPOSITORY RECTAL DAILY
Status: DISCONTINUED | OUTPATIENT
Start: 2024-04-19 | End: 2024-04-22 | Stop reason: HOSPADM

## 2024-04-19 RX ORDER — DIPHENHYDRAMINE HCL 50 MG/ML
25 VIAL (ML) INJECTION EVERY 6 HOURS PRN
Status: DISCONTINUED | OUTPATIENT
Start: 2024-04-19 | End: 2024-04-22 | Stop reason: HOSPADM

## 2024-04-19 RX ORDER — PILOCARPINE HYDROCHLORIDE 5 MG/1
5 TABLET, FILM COATED ORAL 2 TIMES DAILY
Status: DISCONTINUED | OUTPATIENT
Start: 2024-04-19 | End: 2024-04-22 | Stop reason: HOSPADM

## 2024-04-19 RX ORDER — FENTANYL CITRATE 50 UG/ML
INJECTION, SOLUTION INTRAMUSCULAR; INTRAVENOUS
Status: COMPLETED
Start: 2024-04-19 | End: 2024-04-19

## 2024-04-19 RX ORDER — POLYETHYLENE GLYCOL 3350 17 G/17G
17 POWDER, FOR SOLUTION ORAL DAILY
Status: DISCONTINUED | OUTPATIENT
Start: 2024-04-19 | End: 2024-04-22 | Stop reason: HOSPADM

## 2024-04-19 RX ORDER — GLYCOPYRROLATE 0.6MG/3ML
SYRINGE (ML) INTRAVENOUS AS NEEDED
Status: DISCONTINUED | OUTPATIENT
Start: 2024-04-19 | End: 2024-04-19 | Stop reason: SURG

## 2024-04-19 RX ORDER — DEXAMETHASONE SODIUM PHOSPHATE 4 MG/ML
10 INJECTION, SOLUTION INTRA-ARTICULAR; INTRALESIONAL; INTRAMUSCULAR; INTRAVENOUS; SOFT TISSUE
Status: CANCELLED | OUTPATIENT
Start: 2024-04-20 | End: 2024-04-20

## 2024-04-19 RX ORDER — LUBIPROSTONE 24 UG/1
24 CAPSULE ORAL
Status: DISCONTINUED | OUTPATIENT
Start: 2024-04-20 | End: 2024-04-22 | Stop reason: HOSPADM

## 2024-04-19 RX ORDER — ONDANSETRON 4 MG/1
4 TABLET, ORALLY DISINTEGRATING ORAL EVERY 8 HOURS PRN
Status: DISCONTINUED | OUTPATIENT
Start: 2024-04-19 | End: 2024-04-22 | Stop reason: HOSPADM

## 2024-04-19 RX ORDER — VANCOMYCIN/0.9 % SOD CHLORIDE 1.5G/250ML
1.5 PLASTIC BAG, INJECTION (ML) INTRAVENOUS
Status: COMPLETED | OUTPATIENT
Start: 2024-04-19 | End: 2024-04-19

## 2024-04-19 RX ORDER — ONDANSETRON HYDROCHLORIDE 2 MG/ML
INJECTION, SOLUTION INTRAVENOUS AS NEEDED
Status: DISCONTINUED | OUTPATIENT
Start: 2024-04-19 | End: 2024-04-19 | Stop reason: SURG

## 2024-04-19 RX ORDER — SODIUM CHLORIDE, SODIUM LACTATE, POTASSIUM CHLORIDE, CALCIUM CHLORIDE 600; 310; 30; 20 MG/100ML; MG/100ML; MG/100ML; MG/100ML
INJECTION, SOLUTION INTRAVENOUS CONTINUOUS
Status: ACTIVE | OUTPATIENT
Start: 2024-04-19 | End: 2024-04-19

## 2024-04-19 RX ORDER — ONDANSETRON HYDROCHLORIDE 2 MG/ML
4 INJECTION, SOLUTION INTRAVENOUS EVERY 8 HOURS PRN
Status: DISCONTINUED | OUTPATIENT
Start: 2024-04-19 | End: 2024-04-22 | Stop reason: HOSPADM

## 2024-04-19 RX ORDER — ATORVASTATIN CALCIUM 20 MG/1
20 TABLET, FILM COATED ORAL DAILY
Status: DISCONTINUED | OUTPATIENT
Start: 2024-04-19 | End: 2024-04-22 | Stop reason: HOSPADM

## 2024-04-19 RX ORDER — LABETALOL HYDROCHLORIDE 5 MG/ML
5 INJECTION, SOLUTION INTRAVENOUS
Status: COMPLETED | OUTPATIENT
Start: 2024-04-19 | End: 2024-04-19

## 2024-04-19 RX ORDER — LIDOCAINE HCL/PF 100 MG/5ML
SYRINGE (ML) INTRAVENOUS AS NEEDED
Status: DISCONTINUED | OUTPATIENT
Start: 2024-04-19 | End: 2024-04-19 | Stop reason: SURG

## 2024-04-19 RX ORDER — SODIUM CHLORIDE 9 MG/ML
INJECTION, SOLUTION INTRAVENOUS CONTINUOUS
Status: ACTIVE | OUTPATIENT
Start: 2024-04-19 | End: 2024-04-20

## 2024-04-19 RX ORDER — MIDAZOLAM HYDROCHLORIDE 2 MG/2ML
INJECTION, SOLUTION INTRAMUSCULAR; INTRAVENOUS AS NEEDED
Status: DISCONTINUED | OUTPATIENT
Start: 2024-04-19 | End: 2024-04-19 | Stop reason: SURG

## 2024-04-19 RX ORDER — PANTOPRAZOLE SODIUM 40 MG/1
40 TABLET, DELAYED RELEASE ORAL
Status: DISCONTINUED | OUTPATIENT
Start: 2024-04-19 | End: 2024-04-22 | Stop reason: HOSPADM

## 2024-04-19 RX ORDER — DIPHENHYDRAMINE HCL 25 MG
25 CAPSULE ORAL EVERY 6 HOURS PRN
Status: DISCONTINUED | OUTPATIENT
Start: 2024-04-19 | End: 2024-04-22 | Stop reason: HOSPADM

## 2024-04-19 RX ORDER — TIZANIDINE 4 MG/1
4 TABLET ORAL EVERY 6 HOURS PRN
Status: DISCONTINUED | OUTPATIENT
Start: 2024-04-19 | End: 2024-04-22 | Stop reason: HOSPADM

## 2024-04-19 RX ORDER — PHENYLEPHRINE HCL IN 0.9% NACL 50MG/250ML
PLASTIC BAG, INJECTION (ML) INTRAVENOUS CONTINUOUS PRN
Status: DISCONTINUED | OUTPATIENT
Start: 2024-04-19 | End: 2024-04-19 | Stop reason: SURG

## 2024-04-19 RX ORDER — FLUOXETINE HYDROCHLORIDE 20 MG/1
60 CAPSULE ORAL DAILY
Status: DISCONTINUED | OUTPATIENT
Start: 2024-04-19 | End: 2024-04-22 | Stop reason: HOSPADM

## 2024-04-19 RX ORDER — PROPOFOL 10 MG/ML
INJECTION, EMULSION INTRAVENOUS CONTINUOUS PRN
Status: DISCONTINUED | OUTPATIENT
Start: 2024-04-19 | End: 2024-04-19 | Stop reason: SURG

## 2024-04-19 RX ORDER — FENTANYL CITRATE 50 UG/ML
25 INJECTION, SOLUTION INTRAMUSCULAR; INTRAVENOUS EVERY 5 MIN PRN
Status: COMPLETED | OUTPATIENT
Start: 2024-04-19 | End: 2024-04-19

## 2024-04-19 RX ORDER — DEXTROSE 50 % IN WATER (D50W) INTRAVENOUS SYRINGE
25 AS NEEDED
Status: DISCONTINUED | OUTPATIENT
Start: 2024-04-19 | End: 2024-04-22 | Stop reason: HOSPADM

## 2024-04-19 RX ORDER — ALUMINUM HYDROXIDE, MAGNESIUM HYDROXIDE, AND SIMETHICONE 1200; 120; 1200 MG/30ML; MG/30ML; MG/30ML
30 SUSPENSION ORAL EVERY 4 HOURS PRN
Status: DISCONTINUED | OUTPATIENT
Start: 2024-04-19 | End: 2024-04-22 | Stop reason: HOSPADM

## 2024-04-19 RX ORDER — ACETAMINOPHEN 325 MG/1
975 TABLET ORAL
Status: DISCONTINUED | OUTPATIENT
Start: 2024-04-19 | End: 2024-04-21

## 2024-04-19 RX ORDER — LABETALOL HYDROCHLORIDE 5 MG/ML
INJECTION, SOLUTION INTRAVENOUS
Status: COMPLETED
Start: 2024-04-19 | End: 2024-04-19

## 2024-04-19 RX ORDER — SODIUM CHLORIDE 0.9 G/100ML
INJECTION, SOLUTION IRRIGATION
Status: DISCONTINUED | OUTPATIENT
Start: 2024-04-19 | End: 2024-04-19 | Stop reason: HOSPADM

## 2024-04-19 RX ORDER — DEXAMETHASONE SODIUM PHOSPHATE 4 MG/ML
INJECTION, SOLUTION INTRA-ARTICULAR; INTRALESIONAL; INTRAMUSCULAR; INTRAVENOUS; SOFT TISSUE AS NEEDED
Status: DISCONTINUED | OUTPATIENT
Start: 2024-04-19 | End: 2024-04-19 | Stop reason: SURG

## 2024-04-19 RX ORDER — IBUPROFEN 200 MG
16-32 TABLET ORAL AS NEEDED
Status: DISCONTINUED | OUTPATIENT
Start: 2024-04-19 | End: 2024-04-22 | Stop reason: HOSPADM

## 2024-04-19 RX ADMIN — REMIFENTANIL HYDROCHLORIDE 0.2 MCG/KG/MIN: 1 INJECTION, POWDER, LYOPHILIZED, FOR SOLUTION INTRAVENOUS at 08:27

## 2024-04-19 RX ADMIN — ACETAMINOPHEN 975 MG: 325 TABLET ORAL at 13:24

## 2024-04-19 RX ADMIN — PHENYLEPHRINE HYDROCHLORIDE 100 MCG: 10 INJECTION INTRAVENOUS at 08:26

## 2024-04-19 RX ADMIN — FENTANYL CITRATE 25 MCG: 50 INJECTION, SOLUTION INTRAMUSCULAR; INTRAVENOUS at 10:52

## 2024-04-19 RX ADMIN — PANTOPRAZOLE SODIUM 40 MG: 40 TABLET, DELAYED RELEASE ORAL at 16:52

## 2024-04-19 RX ADMIN — EPHEDRINE SULFATE 10 MG: 50 INJECTION, SOLUTION INTRAVENOUS at 09:23

## 2024-04-19 RX ADMIN — MONTELUKAST 10 MG: 10 TABLET, FILM COATED ORAL at 23:35

## 2024-04-19 RX ADMIN — LIDOCAINE HYDROCHLORIDE 60 MG: 20 INJECTION, SOLUTION INTRAVENOUS at 08:26

## 2024-04-19 RX ADMIN — PROPOFOL 150 MG: 10 INJECTION, EMULSION INTRAVENOUS at 08:26

## 2024-04-19 RX ADMIN — FENTANYL CITRATE 25 MCG: 50 INJECTION, SOLUTION INTRAMUSCULAR; INTRAVENOUS at 10:57

## 2024-04-19 RX ADMIN — GLYCOPYRROLATE 0.1 MG: 0.2 INJECTION, SOLUTION INTRAMUSCULAR; INTRAVENOUS at 08:20

## 2024-04-19 RX ADMIN — ACETAMINOPHEN 975 MG: 325 TABLET ORAL at 19:48

## 2024-04-19 RX ADMIN — CEFAZOLIN 2 G: 2 INJECTION, POWDER, FOR SOLUTION INTRAMUSCULAR; INTRAVENOUS at 16:51

## 2024-04-19 RX ADMIN — PILOCARPINE HYDROCHLORIDE 5 MG: 5 TABLET, FILM COATED ORAL at 19:49

## 2024-04-19 RX ADMIN — PROPOFOL 150 MCG/KG/MIN: 10 INJECTION, EMULSION INTRAVENOUS at 08:30

## 2024-04-19 RX ADMIN — WATER 1500 MG: 1000 INJECTION, SOLUTION INTRAVENOUS at 07:46

## 2024-04-19 RX ADMIN — PHENYLEPHRINE HYDROCHLORIDE 20 MCG/MIN: 50 INJECTION INTRAVENOUS at 08:30

## 2024-04-19 RX ADMIN — FENTANYL CITRATE 150 MCG: 50 INJECTION, SOLUTION INTRAMUSCULAR; INTRAVENOUS at 08:26

## 2024-04-19 RX ADMIN — FENTANYL CITRATE 25 MCG: 50 INJECTION, SOLUTION INTRAMUSCULAR; INTRAVENOUS at 11:06

## 2024-04-19 RX ADMIN — LABETALOL HYDROCHLORIDE 5 MG: 5 INJECTION, SOLUTION INTRAVENOUS at 12:00

## 2024-04-19 RX ADMIN — LABETALOL HYDROCHLORIDE 5 MG: 5 INJECTION, SOLUTION INTRAVENOUS at 11:41

## 2024-04-19 RX ADMIN — MOMETASONE FUROATE AND FORMOTEROL FUMARATE DIHYDRATE 2 PUFF: 100; 5 AEROSOL RESPIRATORY (INHALATION) at 20:30

## 2024-04-19 RX ADMIN — SODIUM CHLORIDE: 9 INJECTION, SOLUTION INTRAVENOUS at 20:37

## 2024-04-19 RX ADMIN — OXYCODONE HYDROCHLORIDE 15 MG: 5 TABLET ORAL at 13:26

## 2024-04-19 RX ADMIN — FENTANYL CITRATE 25 MCG: 50 INJECTION, SOLUTION INTRAMUSCULAR; INTRAVENOUS at 11:13

## 2024-04-19 RX ADMIN — SODIUM CHLORIDE, POTASSIUM CHLORIDE, SODIUM LACTATE AND CALCIUM CHLORIDE: 600; 310; 30; 20 INJECTION, SOLUTION INTRAVENOUS at 11:02

## 2024-04-19 RX ADMIN — METOPROLOL TARTRATE 75 MG: 50 TABLET, FILM COATED ORAL at 20:30

## 2024-04-19 RX ADMIN — FENTANYL CITRATE 50 MCG: 50 INJECTION, SOLUTION INTRAMUSCULAR; INTRAVENOUS at 10:18

## 2024-04-19 RX ADMIN — CEFAZOLIN 2 G: 2 INJECTION, POWDER, FOR SOLUTION INTRAMUSCULAR; INTRAVENOUS at 08:43

## 2024-04-19 RX ADMIN — SUCCINYLCHOLINE CHLORIDE 100 MG: 20 INJECTION, SOLUTION INTRAMUSCULAR; INTRAVENOUS at 08:26

## 2024-04-19 RX ADMIN — DEXAMETHASONE SODIUM PHOSPHATE 8 MG: 4 INJECTION, SOLUTION INTRAMUSCULAR; INTRAVENOUS at 09:02

## 2024-04-19 RX ADMIN — POLYETHYLENE GLYCOL 3350 17 G: 17 POWDER, FOR SOLUTION ORAL at 13:24

## 2024-04-19 RX ADMIN — MIDAZOLAM HYDROCHLORIDE 2 MG: 1 INJECTION, SOLUTION INTRAMUSCULAR; INTRAVENOUS at 08:20

## 2024-04-19 RX ADMIN — PHENYLEPHRINE HYDROCHLORIDE 100 MCG: 10 INJECTION INTRAVENOUS at 08:53

## 2024-04-19 RX ADMIN — SODIUM CHLORIDE, POTASSIUM CHLORIDE, SODIUM LACTATE AND CALCIUM CHLORIDE: 600; 310; 30; 20 INJECTION, SOLUTION INTRAVENOUS at 07:47

## 2024-04-19 RX ADMIN — ONDANSETRON HYDROCHLORIDE 4 MG: 2 INJECTION, SOLUTION INTRAMUSCULAR; INTRAVENOUS at 09:47

## 2024-04-19 RX ADMIN — EPHEDRINE SULFATE 10 MG: 50 INJECTION, SOLUTION INTRAVENOUS at 08:47

## 2024-04-19 ASSESSMENT — ENCOUNTER SYMPTOMS
BLURRED VISION: 0
CHILLS: 0
EYE DISCHARGE: 0
FEVER: 0
SHORTNESS OF BREATH: 0
DIZZINESS: 0
FOCAL WEAKNESS: 0
STIFFNESS: 1
COUGH: 0
MEMORY LOSS: 0
NECK PAIN: 1
HALLUCINATIONS: 0
BLOATING: 0
ABDOMINAL PAIN: 0

## 2024-04-19 ASSESSMENT — COGNITIVE AND FUNCTIONAL STATUS - GENERAL
STANDING UP FROM CHAIR USING ARMS: 3 - A LITTLE
WALKING IN HOSPITAL ROOM: 3 - A LITTLE
MOVING TO AND FROM BED TO CHAIR: 3 - A LITTLE
CLIMB 3 TO 5 STEPS WITH RAILING: 3 - A LITTLE
STANDING UP FROM CHAIR USING ARMS: 3 - A LITTLE
CLIMB 3 TO 5 STEPS WITH RAILING: 3 - A LITTLE
MOVING TO AND FROM BED TO CHAIR: 3 - A LITTLE
WALKING IN HOSPITAL ROOM: 3 - A LITTLE

## 2024-04-19 NOTE — PROGRESS NOTES
"  Pt seen and examined in PACU  Reports post op neck pain expected  Drowsy, arousable to voice,   NAD, VSS  cervical collar in place; visible posterior neck dressing c/d/i  CHARLES draining sanguinous fluid  SILT B/L UE  5/5 strength B/L UE muscle groups  \"Thumbs up\", \"ok\", and hand intrinsics intact  + radial pulses B/L; fingers warm  Lozoya draining yellow urine    A/P:  POD 0 s/p PCDF C2-5 revision  Pain management: Tylenol q6 hours; Oxy IR prn  DVT prophylaxis: mechanical with SCD's early ambulation  PT/OT/OOB  Cervical collar in place at all times  Xray in PACU  IV ABX for duration of drain  Empty document drain q8   IVF Lozoya to be D/C'ed POD 1  Medical management as per [facility]  See orders for detail    "

## 2024-04-19 NOTE — ANESTHESIA PROCEDURE NOTES
Airway  Urgency: elective    Start Time: 4/19/2024 8:27 AM  Airway not difficult    General Information and Staff    Patient location during procedure: OR  Anesthesiologist: Hola Ling DO  Resident/CRNA: Opal Arboleda CRNA  Performed: resident/CRNA   Performed by: Opal Arboleda CRNA  Authorized by: Hola Ling DO      Indications and Patient Condition  Indications for airway management: anesthesia  Sedation level: deep  Preoxygenated: yes  Patient position: sniffing  Mask difficulty assessment: 1 - vent by mask    Final Airway Details  Final airway type: endotracheal airway      Successful airway: ETT  Cuffed: yes   Successful intubation technique: video laryngoscopy  Facilitating devices/methods: intubating stylet  Endotracheal tube insertion site: oral  Blade: Magda  Blade size: #3  ETT size (mm): 7.0  Cormack-Lehane Classification: grade I - full view of glottis  Placement verified by: chest auscultation and capnometry   Measured from: lips  ETT to lips (cm): 23  Number of attempts at approach: 1  Ventilation between attempts: none  Number of other approaches attempted: 0  Atraumatic airway insertion

## 2024-04-19 NOTE — ASSESSMENT & PLAN NOTE
s/p PCDF C2-5 revision on 4/19   Pain management, nataly-op antibiotics, infante management and DVT prophylaxis per primary team   PT/OT/OOB  Cervical collar in place   Drain in place

## 2024-04-19 NOTE — ASSESSMENT & PLAN NOTE
BP now improved   Likely a component of post-op pain   C/w home lopressor and Exforge   Uptitrate meds as able

## 2024-04-19 NOTE — PLAN OF CARE
Patient was admitted to the floor at 1230 she was A&O X4 her  came to her bedside after admission. She did not express any pain and or discomfort at this time. She was placed back on her IV fluids, Her SCD were placed and her call bell and personal belongings are within reach. Hourly safety rounds to continue

## 2024-04-19 NOTE — PLAN OF CARE
Care Coordination Admission Assessment Note    General Information:  Readmission Within the last 30 days: no previous admission in last 30 days  Does patient have a :    Patient-Specific Goals (include timeframe): pt goal to return to home    Living Arrangements:  Arrived From: home  Current Living Arrangements: condominium  People in Home: spouse  Home Accessibility: wheelchair accessible  Living Arrangement Comments: Pt lives with spouse in 1st floor condo with no steps to enter.    Housing Stability and Utility Access (SDOH):  In the last 12 months, was there a time when you were not able to pay the mortgage or rent on time?: No  In the last 12 months, how many places have you lived?: 1  In the last 12 months, was there a time when you did not have a steady place to sleep or slept in a shelter (including now)?: No  In the past 12 months has the electric, gas, oil, or water company threatened to shut off services in your home?: No    Functional Status Prior to Admission:   Assistive Device/Animal Currently Used at Home: cane, straight  Functional Status Comments: Pt was reportedly independent with functional status prior to hopsital  IADL Comments: Pt was reportedly independent with IADLS prior to hospital     Supports and Services:  Current Outpatient/Agency/Support Group: none  Type of Current Home Care Services:    History of home care episode or rehab stay: [facility]    Discharge Needs Assessment:   Concerns to be Addressed: care coordination/care conferences  Current Discharge Risk:    Anticipated Changes Related to Illness: none    Patient/Family Anticipated Discharge Plan:  Patient/Family Anticipates Transition To: home with family  Patient/Family Anticipated Services at Transition: none    Connection to Community  Patient declined offered resources.    Patient Choice:   Offered/Gave Vendor List:    Patient's Choice of Community Agency(s):         Anticipated Discharge Plan:  Met with  patient. Provided education and contact information for Care Coordination services.: yes  Anticipated Discharge Disposition: home with assistance, home with home health     Transportation Needs (SDOH):  Transportation Concerns: none  Transportation Anticipated: family or friend will provide  Is Out of Hospital DNR needed at discharge?: no    In the past 12 months, has lack of transportation kept you from medical appointments or from getting medications?: No  In the past 12 months, has lack of transportation kept you from meetings, work, or from getting things needed for daily living?: No    Concerns - comments: Screening completed with pt and spouse @ bedside. Pt lives with spouse in 1st floor condo with no steps to enter. Pt PCP and Pharmacy verified and confirmed on pt chart. Pt is known to [facility] and used them in the past. Pt family goal is for pt to return to home with spouse upon medical clearance. Pt spouse to provide transport. CC to f/u as needed.

## 2024-04-19 NOTE — PROGRESS NOTES
Pt without new complaints, doing well  AFVSS CHARLES in place  5/5 motor BLE and BUE all muscle groups  SILT BLE and BUE all sens dist  Dressing dry     A/P stable post op  -pt, oob  -pain control  -abx/drain  -scds  -med management  -collar

## 2024-04-19 NOTE — DISCHARGE INSTRUCTIONS
Dr. Jad Rodriguez MD  DISCAHRGE INSTRUCTIONS: CERVICAL SURGERY   IMPORTANT! Please read entire packet.  INCISION   Please make sure your incisions are checked at least twice daily for signs and symptoms of infection. If any of the below should occur, please call the office [phone] or [phone].   Drainage or leaking from the incision site   Opening of incision   Fevers greater than 101.5 (low grade fevers post-op are normal)   Flu- like symptoms   Increased redness and/ or tenderness around the incision   If anterior surgery, you most likely will have steri-strips and dissolvable sutures. Posterior surgery sutures will be removed at the 2-week post-op visit.   COLLAR   You will be given a cervical collar to wear after surgery. You must wear this collar at all times until seen in the office for your first post-op visit. You do not need to wear it in the shower. You can remove the collar to eat.  We recommend you sleep in a recliner for 1-2 weeks after surgery for comfort and to relieve the swelling. You may sleep in whatever position makes you comfortable. It is normal to experience discomfort   GAUZE DRESSING and SHOWERING   As long as the incision isn't draining, you may shower 2 days after surgery. However, we do ask that you avoid wetting the incision for 4-5 days. Pat dry.   Gently, dry the incision site and replace gauze dressing with tape, if needed. Some like to wear a dressing for extra padding against the collar, but it isn't necessary.   You may wash your hair when taking a shower. You may not take a tub bath or submerge in a pool/hot coretta until seen in the office.   You do not need to apply any ointment or cream to the incision site.   You may shave after 5 days.  EXERCISE   You have unlimited walking and stair climbing privileges.   Walking outside (in nice weather) or walking on a treadmill (no incline) is also allowed.   NO RUNING OR HIGH IMPACT ACTIVITY!!!  Do not lift anything greater than  10-15 lbs. and avoid overhead lifting and reaching.   EATING   It is normal to have a sore throat following this type of neck surgery as well as having a breathing tube.   Solid foods may be difficult to swallow at first. A soft diet is recommended for those who can't swallow large boluses of food. Ensure or Boost glycerin type shakes for extra calories.   CONSTIPATION   It is normal to be constipated after surgery due to anesthesia and pain medications. Make sure to stay hydrated, eat fiber, get up and walk. You can also use warm prune juice and get over the counter MiraLAX, suppositories, enemas, and Magnesium Citrate if constipation persist. Call our office with any concerns at [phone].  PAIN   Pain is expected after surgery. You should have a pain medication and muscle relaxer when you leave the hospital. Take the pain medication as needed (do not exceed the directed dose without calling the office). Take 1 muscle relaxer for muscle spams, you can take it 3 times a day if needed. It is usually most helpful at night and in the morning.   DO NOT take any anti-inflammatories (Like Motrin, Ibuprofen, Advil, Aleve, naproxen, etc.) for 12 weeks after surgery. Taking anti-inflammatories can interfere with fusion healing. You MAY take Tylenol products- acetaminophen (3000mg/day maximum) unless you have a medical condition and you were instructed not to take.   You may use ice/heat across shoulder and back of neck, but do not place directly on the incision site.   Do NOT resume taking Fosamax or Actonel or other osteoporosis medications until 2 weeks after surgery.   You must not use nicotine for at least 12 weeks after surgery including smoking, the patch, nicotine gum and secondhand smoke.   BLOODCLOTS  Some swelling is normal post operatively. If you notice swelling in one or both of your legs that is painful and/or hot to the touch and/ or you have shortness of breath - please give us a call immediately  #[phone]   DRIVING   You may not drive a car until told otherwise by your physician. You will be in a collar until your first office visit and will be assessed at that point.   You may be a passenger in a car, but make sure to make several stops if it is going to be a long trip (1hr+).  If you have an additional questions/ concerns please contact Mary Rodriguez, Nurse Practitioner, [facility] or [facility] at [phone] or log into your patient portal.

## 2024-04-19 NOTE — ANESTHESIOLOGIST PRE-PROCEDURE ATTESTATION
Pre-Procedure Patient Identification:  I am the Primary Anesthesiologist and have identified the patient on 04/19/24 at 7:22 AM.   I have confirmed the procedure(s) will be performed by the following surgeon/proceduralist Jad Rodriguez MD.

## 2024-04-19 NOTE — CONSULTS
Hospital Medicine Service -  Inpatient Consultation         Requesting Physician: Dr. Michael Street     Reason for Consultation: Post-op medical management      HISTORY OF PRESENT ILLNESS      Jeannette Drummond is a 69 y.o. female with a history of moderate AS, scleroderma, GERD, morbid obesity, hypertension and dyslipidemia, asthma has been struggling with neck pain and poor balance. She underwent elective PCDF C2-5 revision on 4/19 for cervical stenosis.    [facility] was consulted for post-op medical management.     Patient was seen and examined in PACU-2. She is AAOx3. Reports post-op neck pain but no other active complants.     PAST MEDICAL AND SURGICAL HISTORY        Past Medical History:   Diagnosis Date    Asthma     Fibromyalgia     GERD (gastroesophageal reflux disease)     Hypertension     Lung abscess (CMS/HCC)     posterior    Oral dryness     Osteoarthritis     Scleroma     Transfusion history     with hospitalization for lung abcess       Past Surgical History:   Procedure Laterality Date    BACK SURGERY      CATARACT EXTRACTION      b/l    NECK SURGERY      bar in neck    REPLACEMENT TOTAL KNEE Bilateral     ROTATOR CUFF REPAIR      b/l    TOTAL HIP ARTHROPLASTY Right        PCP: Divya Alvarado MD    MEDICATIONS        Home Medications:  Medications Prior to Admission   Medication Sig Dispense Refill Last Dose    azelastine (ASTELIN) 137 mcg (0.1 %) nasal spray Administer 137 mcg into affected nostril(s) 2 times daily.   4/19/2024    calcium-magnesium-zinc tablet 1 tablet 2 times   4/19/2024    cevimeline (EVOXAC) 30 mg capsule Take 30 mg by mouth 2 (two) times a day.   4/19/2024    esomeprazole (NexIUM) 20 mg capsule Take 20 mg by mouth daily before breakfast.   4/19/2024    FLUoxetine (PROzac) 10 mg capsule Take 60 mg by mouth daily.   4/19/2024    fluticasone furoate-vilanteroL (BREO ELLIPTA) 100-25 mcg/dose powder for inhalation See Instructions, USE 1 INHALATION ORALLY    DAILY, # 180 gm, Refill(s) 3,  Pharmacy: [facility], 163, 09/25/23 14:13:00 EDT, Height, cm, 101.6, 09/25/23 14:14:00 EDT, Dosing Weight, kg   4/19/2024    JANUVIA 50 mg tablet Take 50 mg by mouth nightly.   4/18/2024    levocetirizine (XYZAL) 5 mg tablet 5 mg.   4/18/2024    lubiprostone (AMITIZA) 24 mcg capsule Take by mouth.   4/18/2024    medical marijuana Inhale 1 each See admin instr. Please be advised that an [facility] hospital staff member has listed medical marijuana as one of your home medications for documentation purposes. Please follow-up with your certified issuing provider for ongoing use   Past Week    metoprolol tartrate (LOPRESSOR) 25 mg tablet Take 75 mg by mouth 2 (two) times a day.   4/19/2024 at 0600    montelukast (SINGULAIR) 10 mg tablet Take by mouth nightly.   4/18/2024    simvastatin (ZOCOR) 40 mg tablet Take 40 mg by mouth nightly.   4/18/2024    traZODone (DESYREL) 50 mg tablet 1 -3tab, Oral, qHS, # 270 tab, 3 Refill(s), Prescription Routing: Route to Pharmacy Electronically, [facility], 12/19/23 13:37:00 EST, Height, cm, 101.8, 12/19/23 13:38:00 EST, Dosing Weight, kg   4/18/2024    VESICARE 10 mg tablet 10 mg.   4/18/2024    albuterol HFA (VENTOLIN HFA) 90 mcg/actuation inhaler Inhale 2 puffs every 4 (four) hours as needed.  6 More than a month    aspirin 81 mg chewable tablet Take 81 mg by mouth daily.   4/12/2024    blood-glucose meter kit by Other route. Use as instructed       ipratropium (ATROVENT) 42 mcg (0.06 %) nasal spray 2 sprays 2 times daily.          Current inpatient medications were personally reviewed.    ALLERGIES        Bee venom protein (honey bee) and Morphine    FAMILY HISTORY        History reviewed. No pertinent family history.    SOCIAL HISTORY        Social History     Socioeconomic History    Marital status:      Spouse name: None    Number of children: None    Years of education: None    Highest education level: None   Tobacco Use  "   Smoking status: Never    Smokeless tobacco: Never   Vaping Use    Vaping Use: Every day    Substances: THC   Substance and Sexual Activity    Alcohol use: No    Drug use: Yes     Frequency: 7.0 times per week     Types: Marijuana     Comment: medical vaping    Sexual activity: Defer     Social Determinants of Health     Food Insecurity: No Food Insecurity (4/19/2024)    Hunger Vital Sign     Worried About Running Out of Food in the Last Year: Never true     Ran Out of Food in the Last Year: Never true       REVIEW OF SYSTEMS      Review of Systems   Constitutional: Negative for chills and fever.   HENT:  Negative for ear pain and hearing loss.    Eyes:  Negative for blurred vision and discharge.   Cardiovascular:  Negative for chest pain.   Respiratory:  Negative for cough and shortness of breath.    Hematologic/Lymphatic: Negative for bleeding problem.   Skin:  Negative for itching.   Musculoskeletal:  Positive for neck pain and stiffness.   Gastrointestinal:  Negative for bloating and abdominal pain.   Neurological:  Negative for dizziness and focal weakness.   Psychiatric/Behavioral:  Negative for hallucinations and memory loss.       PHYSICAL EXAMINATION        Visit Vitals  BP (!) 186/82 (BP Location: Right upper arm, Patient Position: Sitting)   Pulse 76   Temp 36.6 °C (97.8 °F) (Oral)   Resp 16   Ht 1.575 m (5' 2\")   Wt 104 kg (229 lb)   SpO2 98%   BMI 41.88 kg/m²     Body mass index is 41.88 kg/m².    Intake/Output Summary (Last 24 hours) at 4/19/2024 1443  Last data filed at 4/19/2024 1200  Gross per 24 hour   Intake 1400 ml   Output 1175 ml   Net 225 ml       Physical Exam  Gen Well appearing, comfortable, NAD. Obese, AAOX3  HEENT  Neck collar and CHARLES drain in place   CV RR norm s1 and s2 no mrg  Pulm  CTA Bl.  No WRR  Abd +BS soft, NT, ND.    Ext NT, No LE edema  Neuro Non focal  Psych  Full range affect.      LABS / EKG        Labs  I independently reviewed all pertinent labs     CBC Results         " "04/11/24 12/28/17 12/27/17     1141 0348 0407    WBC 9.82 7.30 7.99    RBC 4.34 3.63 3.56    HGB 12.3 11.7 10.6    HCT 37.8 32.0 31.0    MCV 87.1 88.2 87.1    MCH 28.3 32.2 29.8    MCHC 32.5 36.6 34.2     294 295           BMP Results         04/11/24 12/28/17 12/27/17     1141 0348 0407     137 136    K 4.6 3.8 4.3    Cl 100 103 103    CO2 31 29 28    Glucose 93 94 109    BUN 9 5 7    Creatinine 0.7 0.5 0.5    Calcium 8.8 8.3 8.0    Anion Gap 6 5 5    EGFR >60.0 -- --           Comment for EGFR at 1141 on 04/11/24: Calculation based on the Chronic Kidney Disease Epidemiology Collaboration (CKD-EPI) equation refit without adjustment for race.          No results found for: \"COVID19\"     Imaging  I independently reviewed all pertinent imaging results     ASSESSMENT AND RECOMMENDATIONS           Prediabetes  Assessment & Plan  Last A1c 5.6 on 4/11/24  Hold home Januvia. Resume on discharge       Urgency incontinence  Assessment & Plan  Hold vesicare in the immediate pos-op period     Benign essential HTN  Assessment & Plan  BP elevated to 170s/70s in PACU  Likely a component of post-op pain   Resume home lopressor  Uptitrate meds as able     CAD (coronary artery disease)  Assessment & Plan  C/w statin. Defer resumption of aspirin to primary team   C/w lopressor     Mood disorder (CMS/[facility])  Assessment & Plan  C/w Prozac     Irritable bowel syndrome with constipation  Assessment & Plan  C/w Amitiza     GERD (gastroesophageal reflux disease)  Assessment & Plan  C/w PPI     Asthma  Assessment & Plan  Not in acute exacerbation   C/w singulair and dulera     Sicca syndrome (CMS/[facility])  Assessment & Plan  C/w cevimeline (formulary alternative)    * Spinal stenosis  Assessment & Plan  POD 0 s/p PCDF C2-5 revision  Pain management, nataly-op antibiotics, infante management and DVT prophylaxis: per primary team   PT/OT/OOB  Cervical collar in place   Drain in place            Dispo: Will follow along while patient is " hospitalized      Leydi Do MD  4/19/2024

## 2024-04-19 NOTE — ANESTHESIA PREPROCEDURE EVALUATION
Relevant Problems   No relevant active problems       Anesthesia ROS/MED HX      Pulmonary    asthma  Cardiovascular   hypertension  GI/Hepatic   GERD  Endo/Other  Body Habitus: Obese       Past Surgical History:   Procedure Laterality Date    BACK SURGERY      CATARACT EXTRACTION      b/l    NECK SURGERY      bar in neck    REPLACEMENT TOTAL KNEE Bilateral     ROTATOR CUFF REPAIR      b/l    TOTAL HIP ARTHROPLASTY Right        Physical Exam    Airway   Mallampati: II   TM distance: >3 FB   Neck ROM: full  Cardiovascular - normal   Rhythm: regular   Rate: normalPulmonary - normal   clear to auscultation  Dental - normal        Anesthesia Plan    Plan: general    Technique: general endotracheal     Lines and Monitors: PIV     Airway: oral intubation    3 ASA  Blood Products:   Use of Blood Products Discussed: No   Anesthetic plan and risks discussed with: patient  Induction:    intravenous   Postop Plan:   Patient Disposition: inpatient floor planned admission   Pain Management: IV analgesics

## 2024-04-19 NOTE — PROGRESS NOTES
Patient: Jeannette Drummond  Location: [place] [address]  MRN:  [id number]  Today's date:  4/19/2024    Attempted to see patient for therapy. Unable due to patient refused (10/10 pain; RN made aware; will f/u tomorrow).

## 2024-04-19 NOTE — PROGRESS NOTES
Orthopaedic Spine Surgery Post-Operative Check    Interval History:  Patient doing well postoperatively, resting comfortably in bed.    Vital Signs:  Vitals:    04/19/24 1509   BP: (!) 159/75   Pulse: 80   Resp: 16   Temp: 36.3 °C (97.4 °F)   SpO2: 96%       Physical Exam:  Dressing: Clean, dry, intact.  Drain: SS output    Right Upper Extremity:  Inspection: No gross deformity. Skin intact.  Neurovascular: Palpable radial pulse. SILT C4-T1 distribution.  Motor: Deltoid 5/5, biceps 5/5, triceps 5/5, wrist extensors 5/5, hand intrinsics 5/5    Left Upper Extremity:  Inspection: No gross deformity. Skin intact.  Neurovascular: Palpable radial pulse. SILT C4-T1 distribution.  Motor: Deltoid 5/5, biceps 5/5, triceps 5/5, wrist extensors 5/5, hand intrinsics 5/5    Right Lower Extremity:  Inspection: No gross deformity. Skin intact.   Neurovascular: Palpable DP pulse. SILT L2-S1 distibution.  Motor: IP 5/5, Quad 5/5, TA 5/5, EHL 5/5, GS 5/5    Left Lower Extremity:  Inspection: No gross deformity. Skin intact.   Neurovascular: Palpable DP pulse. SILT L2-S1 distibution.  Motor: IP 5/5, Quad 5/5, TA 5/5, EHL 5/5, GS 5/5    Assessment and Plan  Jeannette Drummond is a 69 y.o. female s/p Procedure(s):  C2-5 posterior cervical decompression and fusion instrumentation allograft autograft revision of existing instrumentation     -- Pain control  -- SCDs  -- Advance diet as tolerated  -- Monitor drain output  -- Abx  -- PT/OT  -- Monitor neurovascular status  -- Medical co-management    Zachary Dickens MD  Orthopaedic Surgery

## 2024-04-19 NOTE — ANESTHESIA POSTPROCEDURE EVALUATION
Patient: Jeannette Drummond    Procedure Summary       Date: 04/19/24 Room / Location:  [facility] OR 03 /  OR [facility]    Anesthesia Start: 0821 Anesthesia Stop: 1043    Procedure: C2-5 posterior cervical decompression and fusion instrumentation allograft autograft revision of existing instrumentation (Spine Cervical) Diagnosis:       Cervical spondylosis with myelopathy      Cervical spinal stenosis      (spondylosis cervical)      (cervical stenosis)    Surgeons: Jad Rodriguez MD Responsible Provider: Hola Ling DO    Anesthesia Type: general ASA Status: 3            Anesthesia Type: general  PACU Vitals  4/19/2024 1036 - 4/19/2024 1136        4/19/2024  1040 4/19/2024  1050 4/19/2024  1110 4/19/2024  1120    BP: 157/65 176/79 157/68 165/74    Temp: 36.2 °C (97.1 °F) -- -- --    Pulse: 75 76 73 74    Resp: 20 16 15 14    SpO2: 95 % 95 % 95 % 95 %                4/19/2024  1130             BP: 173/74       Temp: --       Pulse: 74       Resp: 12       SpO2: 96 %                 Anesthesia Post Evaluation    Pain management: adequate  Mode of pain management: IV medication  Patient location during evaluation: PACU  Patient participation: complete - patient participated  Level of consciousness: awake and alert  Cardiovascular status: acceptable  Airway Patency: adequate  Respiratory status: acceptable  Hydration status: acceptable  Anesthetic complications: no

## 2024-04-19 NOTE — OR SURGEON
Pre-Procedure patient identification:  I am the primary operating surgeon/proceduralist and I have reviewed the applicable pathology reports and radiology studies for this procedure. I have identified the patient on 04/19/24 at 7:30 AM Jad Rodriguez MD  Phone Number: [phone]

## 2024-04-20 PROBLEM — R51.9 FRONTAL HEADACHE: Status: ACTIVE | Noted: 2024-04-20

## 2024-04-20 PROBLEM — M48.02 STENOSIS OF CERVICAL SPINE WITH MYELOPATHY (CMS/HCC): Status: ACTIVE | Noted: 2024-04-19

## 2024-04-20 PROBLEM — G99.2 STENOSIS OF CERVICAL SPINE WITH MYELOPATHY (CMS/HCC): Status: ACTIVE | Noted: 2024-04-19

## 2024-04-20 PROBLEM — D72.829 LEUKOCYTOSIS: Status: ACTIVE | Noted: 2024-04-20

## 2024-04-20 LAB
ANION GAP SERPL CALC-SCNC: 8 MEQ/L (ref 3–15)
BUN SERPL-MCNC: 8 MG/DL (ref 7–25)
CALCIUM SERPL-MCNC: 8.2 MG/DL (ref 8.6–10.3)
CHLORIDE SERPL-SCNC: 104 MEQ/L (ref 98–107)
CO2 SERPL-SCNC: 26 MEQ/L (ref 21–31)
CREAT SERPL-MCNC: 0.5 MG/DL (ref 0.6–1.2)
EGFRCR SERPLBLD CKD-EPI 2021: >60 ML/MIN/1.73M*2
ERYTHROCYTE [DISTWIDTH] IN BLOOD BY AUTOMATED COUNT: 13.6 % (ref 11.7–14.4)
GLUCOSE SERPL-MCNC: 125 MG/DL (ref 70–99)
HCT VFR BLD AUTO: 37 % (ref 35–45)
HGB BLD-MCNC: 11.9 G/DL (ref 11.8–15.7)
MCH RBC QN AUTO: 28.3 PG (ref 28–33.2)
MCHC RBC AUTO-ENTMCNC: 32.2 G/DL (ref 32.2–35.5)
MCV RBC AUTO: 87.9 FL (ref 83–98)
PDW BLD AUTO: 10.1 FL (ref 9.4–12.3)
PLATELET # BLD AUTO: 351 K/UL (ref 150–369)
POTASSIUM SERPL-SCNC: 4.4 MEQ/L (ref 3.5–5.1)
RBC # BLD AUTO: 4.21 M/UL (ref 3.93–5.22)
SODIUM SERPL-SCNC: 138 MEQ/L (ref 136–145)
WBC # BLD AUTO: 16.03 K/UL (ref 3.8–10.5)

## 2024-04-20 PROCEDURE — 63600000 HC DRUGS/DETAIL CODE: Mod: JZ | Performed by: NURSE PRACTITIONER

## 2024-04-20 PROCEDURE — 63700000 HC SELF-ADMINISTRABLE DRUG: Performed by: STUDENT IN AN ORGANIZED HEALTH CARE EDUCATION/TRAINING PROGRAM

## 2024-04-20 PROCEDURE — 97162 PT EVAL MOD COMPLEX 30 MIN: CPT | Mod: GP

## 2024-04-20 PROCEDURE — 85027 COMPLETE CBC AUTOMATED: CPT | Performed by: NURSE PRACTITIONER

## 2024-04-20 PROCEDURE — 63700000 HC SELF-ADMINISTRABLE DRUG: Performed by: NURSE PRACTITIONER

## 2024-04-20 PROCEDURE — 97535 SELF CARE MNGMENT TRAINING: CPT | Mod: GO

## 2024-04-20 PROCEDURE — 97530 THERAPEUTIC ACTIVITIES: CPT | Mod: GP

## 2024-04-20 PROCEDURE — 97166 OT EVAL MOD COMPLEX 45 MIN: CPT | Mod: GO

## 2024-04-20 PROCEDURE — 25800000 HC PHARMACY IV SOLUTIONS: Performed by: NURSE PRACTITIONER

## 2024-04-20 PROCEDURE — 12000000 HC ROOM AND CARE MED/SURG

## 2024-04-20 PROCEDURE — 36415 COLL VENOUS BLD VENIPUNCTURE: CPT | Performed by: NURSE PRACTITIONER

## 2024-04-20 PROCEDURE — 80048 BASIC METABOLIC PNL TOTAL CA: CPT | Performed by: NURSE PRACTITIONER

## 2024-04-20 PROCEDURE — 99232 SBSQ HOSP IP/OBS MODERATE 35: CPT | Performed by: STUDENT IN AN ORGANIZED HEALTH CARE EDUCATION/TRAINING PROGRAM

## 2024-04-20 RX ORDER — AMLODIPINE AND VALSARTAN 10; 160 MG/1; MG/1
TABLET ORAL DAILY
Status: DISCONTINUED | OUTPATIENT
Start: 2024-04-20 | End: 2024-04-22 | Stop reason: HOSPADM

## 2024-04-20 RX ORDER — AMLODIPINE AND VALSARTAN 10; 160 MG/1; MG/1
TABLET ORAL DAILY
COMMUNITY

## 2024-04-20 RX ORDER — AMLODIPINE AND VALSARTAN 10; 160 MG/1; MG/1
TABLET ORAL DAILY
Status: DISCONTINUED | OUTPATIENT
Start: 2024-04-20 | End: 2024-04-20

## 2024-04-20 RX ADMIN — PILOCARPINE HYDROCHLORIDE 5 MG: 5 TABLET, FILM COATED ORAL at 08:44

## 2024-04-20 RX ADMIN — ACETAMINOPHEN 975 MG: 325 TABLET ORAL at 20:00

## 2024-04-20 RX ADMIN — CEFAZOLIN 2 G: 2 INJECTION, POWDER, FOR SOLUTION INTRAMUSCULAR; INTRAVENOUS at 23:54

## 2024-04-20 RX ADMIN — FLUOXETINE HYDROCHLORIDE 60 MG: 20 CAPSULE ORAL at 08:21

## 2024-04-20 RX ADMIN — ACETAMINOPHEN 975 MG: 325 TABLET ORAL at 16:12

## 2024-04-20 RX ADMIN — METOPROLOL TARTRATE 75 MG: 50 TABLET, FILM COATED ORAL at 20:15

## 2024-04-20 RX ADMIN — PILOCARPINE HYDROCHLORIDE 5 MG: 5 TABLET, FILM COATED ORAL at 20:15

## 2024-04-20 RX ADMIN — TIZANIDINE 4 MG: 4 TABLET ORAL at 17:32

## 2024-04-20 RX ADMIN — CEFAZOLIN 2 G: 2 INJECTION, POWDER, FOR SOLUTION INTRAMUSCULAR; INTRAVENOUS at 08:21

## 2024-04-20 RX ADMIN — MOMETASONE FUROATE AND FORMOTEROL FUMARATE DIHYDRATE 2 PUFF: 100; 5 AEROSOL RESPIRATORY (INHALATION) at 08:21

## 2024-04-20 RX ADMIN — ONDANSETRON 4 MG: 4 TABLET, ORALLY DISINTEGRATING ORAL at 16:18

## 2024-04-20 RX ADMIN — MONTELUKAST 10 MG: 10 TABLET, FILM COATED ORAL at 21:52

## 2024-04-20 RX ADMIN — PANTOPRAZOLE SODIUM 40 MG: 40 TABLET, DELAYED RELEASE ORAL at 07:12

## 2024-04-20 RX ADMIN — CEFAZOLIN 2 G: 2 INJECTION, POWDER, FOR SOLUTION INTRAMUSCULAR; INTRAVENOUS at 16:11

## 2024-04-20 RX ADMIN — VALSARTAN: 160 TABLET ORAL at 11:46

## 2024-04-20 RX ADMIN — METOPROLOL TARTRATE 75 MG: 50 TABLET, FILM COATED ORAL at 08:21

## 2024-04-20 RX ADMIN — CEFAZOLIN 2 G: 2 INJECTION, POWDER, FOR SOLUTION INTRAMUSCULAR; INTRAVENOUS at 01:27

## 2024-04-20 RX ADMIN — OXYCODONE HYDROCHLORIDE 10 MG: 5 TABLET ORAL at 01:27

## 2024-04-20 RX ADMIN — ONDANSETRON 4 MG: 4 TABLET, ORALLY DISINTEGRATING ORAL at 02:08

## 2024-04-20 RX ADMIN — ACETAMINOPHEN 975 MG: 325 TABLET ORAL at 01:27

## 2024-04-20 RX ADMIN — PANTOPRAZOLE SODIUM 40 MG: 40 TABLET, DELAYED RELEASE ORAL at 16:11

## 2024-04-20 RX ADMIN — LUBIPROSTONE 24 MCG: 24 CAPSULE, GELATIN COATED ORAL at 08:21

## 2024-04-20 RX ADMIN — ATORVASTATIN CALCIUM 20 MG: 20 TABLET, FILM COATED ORAL at 08:22

## 2024-04-20 RX ADMIN — ACETAMINOPHEN 975 MG: 325 TABLET ORAL at 07:12

## 2024-04-20 ASSESSMENT — COGNITIVE AND FUNCTIONAL STATUS - GENERAL
AFFECT: WFL
AFFECT: WFL
TOILETING: 3 - A LITTLE
EATING MEALS: 4 - NONE
STANDING UP FROM CHAIR USING ARMS: 3 - A LITTLE
DRESSING REGULAR UPPER BODY CLOTHING: 4 - NONE
WALKING IN HOSPITAL ROOM: 3 - A LITTLE
CLIMB 3 TO 5 STEPS WITH RAILING: 3 - A LITTLE
WALKING IN HOSPITAL ROOM: 3 - A LITTLE
DRESSING REGULAR LOWER BODY CLOTHING: 3 - A LITTLE
HELP NEEDED FOR PERSONAL GROOMING: 3 - A LITTLE
HELP NEEDED FOR BATHING: 3 - A LITTLE
STANDING UP FROM CHAIR USING ARMS: 3 - A LITTLE
CLIMB 3 TO 5 STEPS WITH RAILING: 3 - A LITTLE
MOVING TO AND FROM BED TO CHAIR: 3 - A LITTLE
MOVING TO AND FROM BED TO CHAIR: 3 - A LITTLE

## 2024-04-20 NOTE — ASSESSMENT & PLAN NOTE
Frontal and occipital headache that started on 4/20   Likely post-op and hypertension related   Pain control with scheduled tylenol, prn zanaflex, prn roxicodone and prn fioricet

## 2024-04-20 NOTE — PROGRESS NOTES
Pt without new complaints, doing well  AFVSS CHARLES in place  5/5 motor BLE and BUE all muscle groups  SILT BLE and BUE all sens dist  Dressing dry     A/P stable post op day 1  -pt, oob  -pain control  -abx/drain  -scds  -med management  -collar

## 2024-04-20 NOTE — PROGRESS NOTES
Physical Therapy -  Initial Evaluation     Patient: Jeannette Drummond  Location: [place] [address]  MRN: [id number]  Today's date: 4/20/2024    HISTORY OF PRESENT ILLNESS     Jeannette is a 69 y.o. female admitted on 4/19/2024 with Cervical spondylosis with myelopathy [M47.12]  Cervical spinal stenosis [M48.02]  Spinal stenosis [M48.00]. Principal problem is Stenosis of cervical spine with myelopathy (CMS/HCC).    Past Medical History  Jeannette has a past medical history of Asthma, Fibromyalgia, GERD (gastroesophageal reflux disease), Hypertension, Lung abscess (CMS/HCC), Oral dryness, Osteoarthritis, Scleroma, and Transfusion history.    History of Present Illness   Pt underwent elective PCDF revision on 4/19 with Dr. Rodriguez    Per ortho note, cervical collar to be worn at all times  PRIOR LEVEL OF FUNCTION AND LIVING ENVIRONMENT     Prior Level of Function      Flowsheet Row Most Recent Value   Dominant Hand right   Ambulation assistive equipment   Transferring assistive equipment   Toileting independent   Bathing independent   Dressing independent   Eating independent   IADLs assistive person   Driving/Transportation friends/family   Communication understands/communicates without difficulty   Prior Level of Function Comment Pt reports use of SPC and independence with ADLs. Pt reports that she and her  share IADL responsibilities and her  provides community transport   Assistive Device Currently Used at Home cane, straight, raised toilet, grab bar, shower chair  [pt reports that she has a RW, sock aid, and reacher at home]          Prior Living Environment      Flowsheet Row Most Recent Value   People in Home spouse   Current Living Arrangements condominium   Home Accessibility wheelchair accessible   Living Environment Comment pt reports that she lives with her spouse in a condo with 0STE, one floor living. Pt reports that she .sleeps in a recliner, has a WIS with sc and gb and RTS with gb           VITALS AND PAIN     PT Vitals      Date/Time Pulse SpO2 Pt Activity O2 Therapy BP BP Location BP Method Pt Position Who   04/20/24 0926 76 100 % At rest None (Room air) 165/90 Right upper arm Automatic Lying CMB   04/20/24 0950 81 98 % At rest None (Room air) 155/65 Right upper arm Automatic Sitting CMB          PT Pain      Date/Time Pain Type Location Rating: Rest Rating: Activity Interventions [facility]   04/20/24 0926 Pain Assessment neck 8 8 care clustered CMB             Objective   OBJECTIVE     Start time:  0926  End time:  0952  Session Length: 26 min  Mode of Treatment: co-treatment, physical therapy (For potential D/C today)    General Observations  Patient received reclined, in chair. She was agreeable to therapy, no issues or concerns identified by nurse prior to session.      Precautions: fall, spinal, brace worn at all times        Services  Do You Speak a Language Other Than English at Home?: no  Is an  Needed/Used?: No      PT Eval and Treat - 04/20/24 0952          Cognition    Orientation Status oriented x 4     Affect/Mental Status WFL     Follows Commands WFL     Cognitive Function WFL        Vision Assessment/Intervention    Vision Assessment corrective lenses full-time        Hearing Assessment    Hearing Status WFL        Sensory Assessment    Sensory Assessment sensation intact, lower extremities        Lower Extremity Assessment    LE Assessment ROM and Strength WFL        Bed Mobility    Bed Mobility Activities sit to supine     [place] supervision     Safety/Cues increased time to complete;verbal cues;sequencing;technique     Assistive Device bed rails;head of bed elevated     Comment Patient c/o HA while OOB/ambulating, returned to supine at the end of evaluation, headache improved - RN made aware/RN planned to alert orthopedics        Mobility Belt    Mobility Belt Used for All Out of Bed Activity yes        Sit/Stand Transfer    Surface chair with arm rests      [place] supervision     Safety/Cues verbal cues;hand placement;proper use of assistive device;sequencing;technique     Assistive Device walker, front-wheeled        Gait Training    [place], Gait supervision     Assistive Device walker, front-wheeled     Distance in Feet 150 feet     Pattern step-through     Deviations/Abnormal Patterns subha decreased;gait speed decreased     Comment (Gait/Stairs) Slow pace, cautious/guarded movement        Balance    Static Sitting Balance mild impairment;sitting in chair;supported     Dynamic Sitting Balance mild impairment;sitting in chair;supported     Sit to Stand Dynamic Balance mild impairment;standing;supported     Static Standing Balance mild impairment;standing;supported     Dynamic Standing Balance mild impairment;standing;supported        Impairments/Safety Issues    Impairments Affecting Function balance;pain;range of motion (ROM)                                    Education Documentation  Pain Management, taught by Uriel Arboleda PT at 4/20/2024 11:57 AM.  Learner: Patient  Readiness: Acceptance  Method: Explanation, Demonstration  Response: Needs Reinforcement  Comment: POC reviewed, mobility cues    Activity, taught by Uriel Arboleda, PT at 4/20/2024 11:57 AM.  Learner: Patient  Readiness: Acceptance  Method: Explanation, Demonstration  Response: Needs Reinforcement  Comment: POC reviewed, mobility cues        Session Outcome  Patient supine, in bed at end of session, bed alarm on, all needs met, call light in reach, personal items in reach. Nursing notified about patient's performance, patient's position, patient's response to therapy/activity, and other (see comments). (Possible spinal HA)    AM-PAC™ - Mobility (Current Function)     Turning form your back to your side while in flat bed without using bedrails 3 - A Little   Moving from lying on your back to sitting on the side of a flat bed without using bedrails 3 - A Little   Moving to and  from a bed to a chair 3 - A Little   Standing up from a chair using your arms 3 - A Little   To walk in a hospital room 3 - A Little   Climbing 3-5 steps with a railing 3 - A Little   AM-PAC™ Mobility Score 18      ASSESSMENT AND PLAN     Progress Summary  POD 1 s/p PCDF of C2-5 by Dr. Rodriguez; prior to admission, patient was living with her spouse in a 1SH with no PHYLLIS (55+community) and ambulating with the use on a SPC; presents today with cervical pain/HA and balance deficits resulting in mobility dysfunction; patient c/o HA when OOB and HA improved after being brought to a supine position with HOB at 20 degrees; patient was left in supine with HOB at 40 degrees; recommend home D/C with assistance from spouse as needed    Patient/Family Therapy Goals Statement: I want go home    PT Plan      Flowsheet Row Most Recent Value   Rehab Potential good, to achieve stated therapy goals at 04/20/2024 0952   Therapy Frequency daily at 04/20/2024 0952   Planned Therapy Interventions balance training, bed mobility training, gait training, patient/family education, stair training, transfer training at 04/20/2024 0952            PT Discharge Recommendations      Flowsheet Row Most Recent Value   PT Recommended Discharge Disposition home with assistance at 04/20/2024 0952   Anticipated Equipment Needs if Discharged Home (PT) none at 04/20/2024 0952                 PT Goals      Flowsheet Row Most Recent Value   Transfer Goal 1    Activity/Assistive Device car transfer, walker, front-wheeled at 04/20/2024 0952   [place] supervision required at 04/20/2024 0952   Time Frame by discharge at 04/20/2024 0952   Progress/Outcome goal ongoing at 04/20/2024 0952   Gait Training Goal 1    Activity/Assistive Device gait (walking locomotion), walker, front-wheeled at 04/20/2024 0952   [place] modified independence at 04/20/2024 0952   Distance 80 at 04/20/2024 0952   Time Frame by discharge at 04/20/2024 0952   Progress/Outcome goal  ongoing at 04/20/2024 0952

## 2024-04-20 NOTE — PROGRESS NOTES
Hospital Medicine Service -  Daily Progress Note       SUBJECTIVE   Interval History: Patient was seen and examined at bedside. She complains of frontal positional headache this morning. She denies bilateral arm weakness or numbness/tingling.     BP this morning was 185/75      OBJECTIVE      Vital signs in last 24 hours:  Temp:  [36.4 °C (97.6 °F)-37.1 °C (98.7 °F)] 36.4 °C (97.6 °F)  Heart Rate:  [70-81] 78  Resp:  [16-18] 18  BP: (155-185)/(61-90) 169/61    Intake/Output Summary (Last 24 hours) at 4/20/2024 1653  Last data filed at 4/20/2024 1412  Gross per 24 hour   Intake --   Output 855 ml   Net -855 ml       PHYSICAL EXAMINATION      Physical Exam  Gen Well appearing, comfortable, NAD. Obese, AAOX3  HEENT  Neck collar and CHARLES drain in place   CV RR norm s1 and s2 no mrg  Pulm  CTA Bl.  No WRR  Abd +BS soft, NT, ND.    Ext NT, No LE edema  Neuro Non focal  Psych  Full range affect.     LINES, CATHETERS, DRAINS, AIRWAYS, AND WOUNDS   Lines, Drains, and Airways:  Wounds (agree with documentation and present on admission):  Peripheral IV (Adult) 04/19/24 Posterior;Right Hand (Active)   Number of days: 1       Drain Posterior Neck 15 Fr. (Active)   Number of days: 1       Surgical Incision Neck (Active)   Number of days: 1         Comments:      LABS / IMAGING / TELE      Labs  I independently reviewed all pertinent labs     CBC Results         04/20/24 04/11/24 12/28/17     0533 1141 0348    WBC 16.03 9.82 7.30    RBC 4.21 4.34 3.63    HGB 11.9 12.3 11.7    HCT 37.0 37.8 32.0    MCV 87.9 87.1 88.2    MCH 28.3 28.3 32.2    MCHC 32.2 32.5 36.6     340 294           BMP Results         04/20/24 04/11/24 12/28/17     0533 1141 0348     137 137    K 4.4 4.6 3.8    Cl 104 100 103    CO2 26 31 29    Glucose 125 93 94    BUN 8 9 5    Creatinine 0.5 0.7 0.5    Calcium 8.2 8.8 8.3    Anion Gap 8 6 5    EGFR >60.0 >60.0 --           Comment for EGFR at 0533 on 04/20/24: Calculation based on the Chronic  Kidney Disease Epidemiology Collaboration (CKD-EPI) equation refit without adjustment for race.    Comment for EGFR at 1141 on 04/11/24: Calculation based on the Chronic Kidney Disease Epidemiology Collaboration (CKD-EPI) equation refit without adjustment for race.           Imaging  I independently reviewed all pertinent imaging results     ECG/Telemetry  Med-surg floor     ASSESSMENT AND PLAN      Leukocytosis  Assessment & Plan  Post-op, reactive  Monitor CBC     Frontal headache  Assessment & Plan  Frontal positional headache that started this morning   Likely post-op and hypertension related   Pain control with scheduled tylenol, prn zanaflex and prn roxicodone     Prediabetes  Assessment & Plan  Last A1c 5.6 on 4/11/24  Hold home Januvia. Resume on discharge       Urgency incontinence  Assessment & Plan  Hold vesicare in the immediate pos-op period     Benign essential HTN  Assessment & Plan  BP elevated to 180s/70s this morning   Likely a component of post-op pain   Resume home lopressor and Exforge   Uptitrate meds as able     CAD (coronary artery disease)  Assessment & Plan  C/w statin. Defer resumption of aspirin to primary team   C/w lopressor     Mood disorder (CMS/HCC)  Assessment & Plan  C/w Prozac     Irritable bowel syndrome with constipation  Assessment & Plan  C/w Amitiza     GERD (gastroesophageal reflux disease)  Assessment & Plan  C/w PPI     Asthma  Assessment & Plan  Not in acute exacerbation   C/w singulair and dulera     Sicca syndrome (CMS/HCC)  Assessment & Plan  C/w cevimeline (formulary alternative)    * Stenosis of cervical spine with myelopathy (CMS/HCC)  Assessment & Plan  POD 1 s/p PCDF C2-5 revision  Pain management, nataly-op antibiotics, infante management and DVT prophylaxis per primary team   PT/OT/OOB  Cervical collar in place   Drain in place            VTE Assessment: Padua    VTE Prophylaxis:  Current anticoagulants:    None      Code Status: Full Code      Estimated Discharge  Date: 4/21/2024   Disposition Planning: Will follow while patient is hospitalized      Leydi Do MD  4/20/2024

## 2024-04-20 NOTE — HOSPITAL COURSE
Jeannette is a 69 y.o. female admitted on 4/19/2024 with Cervical spondylosis with myelopathy [M47.12]  Cervical spinal stenosis [M48.02]  Spinal stenosis [M48.00]. Principal problem is Stenosis of cervical spine with myelopathy (CMS/HCC).    Past Medical History  Jeannette has a past medical history of Asthma, Fibromyalgia, GERD (gastroesophageal reflux disease), Hypertension, Lung abscess (CMS/HCC), Oral dryness, Osteoarthritis, Scleroma, and Transfusion history.    History of Present Illness   Pt underwent elective PCDF revision on 4/19 with Dr. Rodriguez    Per ortho note, cervical collar to be worn at all times

## 2024-04-20 NOTE — PLAN OF CARE
Problem: Adult Inpatient Plan of Care  Goal: Plan of Care Review  Outcome: Progressing  Flowsheets (Taken 4/20/2024 1145)  Outcome Evaluation: Pt seen post op day 1 s/p C2-C5 PCDF. Pt reports baseline use pf SPC and independence in ADLS. Pt reports that this is her 3rd cervical surgery and so she voiced knowledge of spinal precautions and functional implications, although these were reiterated at start of eval. Pt completed STS with supervision, toilet transfer and toileting tasks with supervision, oral care and LBD task with supervision and with pt using RW. Pt reported a significant headache during eval, with pt reporting that she has had this headache since surgery. RN was notified and pt was returned to lying in bed. Pt did report reduction of symptoms with lying. OT will continue to work with the pt to maximize her functional independence prior to dc and anticipates that when medically stable that pt will dc home with IADl assist from her spouse.  Plan of Care Reviewed With: patient  Goal: Patient-Specific Goal (Individualized)  Outcome: Progressing  Flowsheets (Taken 4/20/2024 1145)  Patient/Family-Specific Goals (Include Timeframe): go home     Problem: Self-Care Deficit  Goal: Improved Ability to Complete Activities of Daily Living  Outcome: Progressing

## 2024-04-20 NOTE — PLAN OF CARE
Problem: Adult Inpatient Plan of Care  Goal: Plan of Care Review  Outcome: Progressing  Flowsheets (Taken 4/20/2024 1156)  Progress: improving  Outcome Evaluation:   POD 1 s/p PCDF of C2-5 by Dr. Rodriguez   prior to admission, patient was living with her spouse in a 1SH with no PHYLLIS (55+community) and ambulating with the use on a SPC   presents today with cervical pain/HA and balance deficits resulting in mobility dysfunction   patient c/o HA when OOB and HA improved after being brought to a supine position with HOB at 20 degrees   patient was left in supine with HOB at 40 degrees   recommend home D/C with assistance from spouse as needed  Plan of Care Reviewed With: patient

## 2024-04-20 NOTE — PLAN OF CARE
Problem: Adult Inpatient Plan of Care  Goal: Plan of Care Review  Outcome: Progressing  Flowsheets (Taken 4/20/2024 8640)  Progress: improving  Outcome Evaluation: Pt AAOx4. OOB to chair ax1 w/ walker. Lozoya d/c 0600. Dressing CDI, cervical collar maintained. NV checks wdl. Meds given epr MAR and PRN request. Fall and safety precautions maintained. Call bell within reach  Plan of Care Reviewed With: patient  Goal: Patient-Specific Goal (Individualized)  Outcome: Progressing  Goal: Absence of Hospital-Acquired Illness or Injury  Outcome: Progressing  Goal: Optimal Comfort and Wellbeing  Outcome: Progressing  Goal: Readiness for Transition of Care  Outcome: Progressing     Problem: Self-Care Deficit  Goal: Improved Ability to Complete Activities of Daily Living  Outcome: Progressing     Problem: Spinal Surgery  Goal: Optimal Coping with Surgery  Outcome: Progressing  Goal: Absence of Bleeding  Outcome: Progressing  Goal: Effective Bowel Elimination  Outcome: Progressing  Goal: Fluid and Electrolyte Balance  Outcome: Progressing  Goal: Optimal Functional Ability  Outcome: Progressing  Goal: Absence of Infection Signs and Symptoms  Outcome: Progressing  Goal: Optimal Neurologic Function  Outcome: Progressing  Goal: Anesthesia/Sedation Recovery  Outcome: Progressing  Goal: Optimal Pain Control and Function  Outcome: Progressing  Goal: Nausea and Vomiting Relief  Outcome: Progressing  Goal: Effective Urinary Elimination  Outcome: Progressing  Goal: Effective Oxygenation and Ventilation  Outcome: Progressing     Problem: Skin Injury Risk Increased  Goal: Skin Health and Integrity  Outcome: Progressing     Problem: Fall Injury Risk  Goal: Absence of Fall and Fall-Related Injury  Outcome: Progressing   Plan of Care Review  Plan of Care Reviewed With: patient  Progress: improving  Outcome Evaluation: Pt AAOx4. OOB to chair ax1 w/ walker. Lozoya d/c 0600. Dressing CDI, cervical collar maintained. NV checks wdl. Meds given epr  MAR and PRN request. Fall and safety precautions maintained. Call bell within reach

## 2024-04-20 NOTE — PROGRESS NOTES
Orthopaedic Spine Surgery Progress Note    Interval History:  No acute events. Pain controlled.    Vital Signs:  Vitals:    04/20/24 0755   BP: (!) 185/75   Pulse: 72   Resp: 17   Temp: 36.8 °C (98.2 °F)   SpO2: 96%       Physical Exam:  Dressing: Clean, dry, intact.  Drain: SS output    Right Upper Extremity:  Inspection: No gross deformity. Skin intact.  Neurovascular: Palpable radial pulse. SILT C4-T1 distribution.  Motor: Deltoid 5/5, biceps 5/5, triceps 5/5, wrist extensors 5/5, hand intrinsics 5/5    Left Upper Extremity:  Inspection: No gross deformity. Skin intact.  Neurovascular: Palpable radial pulse. SILT C4-T1 distribution.  Motor: Deltoid 5/5, biceps 5/5, triceps 5/5, wrist extensors 5/5, hand intrinsics 5/5    Right Lower Extremity:  Inspection: No gross deformity. Skin intact.   Neurovascular: Palpable DP pulse. SILT L2-S1 distibution.  Motor: IP 5/5, Quad 5/5, TA 5/5, EHL 5/5, GS 5/5    Left Lower Extremity:  Inspection: No gross deformity. Skin intact.   Neurovascular: Palpable DP pulse. SILT L2-S1 distibution.  Motor: IP 5/5, Quad 5/5, TA 5/5, EHL 5/5, GS 5/5    Assessment and Plan  Jeannette Drummond is a 69 y.o. female s/p Procedure(s):  C2-5 posterior cervical decompression and fusion instrumentation allograft autograft revision of existing instrumentation     -- Pain control  -- SCDs  -- Advance diet as tolerated  -- Monitor drain output  -- Abx  -- PT/OT  -- Monitor neurovascular status  -- Medical co-management    Zachary Dickens MD  Orthopaedic Surgery

## 2024-04-20 NOTE — PROGRESS NOTES
Occupational Therapy -  Initial Evaluation     Patient: Jeannette Drummond  Location: [place] [address]  MRN: [id number]  Today's date: 4/20/2024    HISTORY OF PRESENT ILLNESS     Jeannette is a 69 y.o. female admitted on 4/19/2024 with Cervical spondylosis with myelopathy [M47.12]  Cervical spinal stenosis [M48.02]  Spinal stenosis [M48.00]. Principal problem is Stenosis of cervical spine with myelopathy (CMS/HCC).    Past Medical History  Jeannette has a past medical history of Asthma, Fibromyalgia, GERD (gastroesophageal reflux disease), Hypertension, Lung abscess (CMS/HCC), Oral dryness, Osteoarthritis, Scleroma, and Transfusion history.    History of Present Illness   Pt underwent elective PCDF revision on 4/19 with Dr. Rodriguez    Per ortho note, cervical collar to be worn at all times  PRIOR LEVEL OF FUNCTION AND LIVING ENVIRONMENT     Prior Level of Function      Flowsheet Row Most Recent Value   Dominant Hand right   Ambulation assistive equipment   Transferring assistive equipment   Toileting independent   Bathing independent   Dressing independent   Eating independent   IADLs assistive person   Driving/Transportation friends/family   Communication understands/communicates without difficulty   Prior Level of Function Comment Pt reports use of SPC and independence with ADLs. Pt reports that she and her  share IADL responsibilities and her  provides community transport   Assistive Device Currently Used at Home cane, straight, raised toilet, grab bar, shower chair  [pt reports that she has a RW, sock aid, and reacher at home]          Prior Living Environment      Flowsheet Row Most Recent Value   People in Home spouse   Current Living Arrangements condominium   Home Accessibility wheelchair accessible   Living Environment Comment pt reports that she lives with her spouse in a condo with 0STE, one floor living. Pt reports that she .sleeps in a recliner, has a WIS with sc and gb and RTS with gb           Occupational Profile      Flowsheet Row Most Recent Value   Environmental Supports and Barriers supportive spouse          VITALS AND PAIN     OT Vitals      Date/Time Pulse SpO2 Pt Activity O2 Therapy BP BP Location BP Method Pt Position [facility]   04/20/24 0926 76 100 % At rest None (Room air) 165/90 Right upper arm Automatic Lying CMB   04/20/24 0950 81 98 % At rest None (Room air) 155/65 Right upper arm Automatic Sitting CMB          OT Pain      Date/Time Pain Type Location Rating: Rest Rating: Activity Interventions [facility]   04/20/24 0926 Pain Assessment neck 8 8 care clustered CMB             Objective   OBJECTIVE     Start time:  0925  End time:  0952  Session Length: 27 min  Mode of Treatment: co-treatment, occupational therapy (facilitate dc plan)    General Observations  Patient received upright, in chair, leg(s) elevated. She was agreeable to therapy, no issues or concerns identified by nurse prior to session.      Precautions: fall, modified diet, spinal, brace worn at all times (easy to chew, cervical collar at all times)        Services  Do You Speak a Language Other Than English at Home?: no      OT Eval and Treat - 04/20/24 0925          Cognition    Orientation Status oriented x 4     Affect/Mental Status WFL     Follows Commands WFL     Cognitive Function WFL     Comment, Cognition pt educated on spinal precautions and functional implications at start of eval. pt reported that this is her 3rd cervical surgery and so she is aware of precautions        Vision Assessment/Intervention    Vision Assessment corrective lenses full-time        Hearing Assessment    Hearing Status WFL        Sensory Assessment    Sensory Assessment sensation intact, upper extremities        Upper Extremity Assessment    UE Assessment ROM and Strength WFL        Bed Mobility    Bed Mobility Activities sit to supine     [place] supervision     Assistive Device bed rails;head of bed elevated        Mobility Belt     Mobility Belt Used for All Out of Bed Activity yes        Sit/Stand Transfer    Surface chair with arm rests     [place] supervision     Safety/Cues verbal cues;hand placement;technique     Assistive Device walker, front-wheeled        Toilet Transfer    Transfer Technique sit/stand     [place] supervision     Safety/Cues verbal cues;hand placement     Assistive Device grab bars/safety frame;walker, front-wheeled        Lower Body Dressing    Tasks don;underwear     [place] supervision     Position unsupported sitting;unsupported standing     Adaptive Equipment none     Comment able to maintain precautions while performing task        Grooming    Tasks oral care (brushing teeth, cleaning dentures);washes, rinses and dries hands     [place] supervision     Position sink side     Setup Assistance obtain supplies     Adaptive Equipment none     Comment set up provided at sink. pt educated on technique of using 2 cups for oral care while wearing cervical collar        Toileting    Tasks adjust/manage clothing;perform bladder hygiene     [place] supervision     Position unsupported sitting;unsupported standing     Adaptive Equipment none        Balance    Static Sitting Balance WFL;unsupported     Dynamic Sitting Balance WFL;unsupported     Sit to Stand Dynamic Balance WFL     Static Standing Balance WFL     Dynamic Standing Balance WFL;supported     Balance Interventions occupation based/functional task        Impairments/Safety Issues    Impairments Affecting Function pain;endurance/activity tolerance     Functional Endurance pt reported significant headache during eval, pt reported that she has had this headache since surgery. RN notified and pt returned to lying in bed at end of eval with pt reporting reduction of symptoms. RN notified of this                                    Education Documentation  Treatment Plan, taught by Darya Jones OT at 4/20/2024 11:45 AM.  Learner:  Patient  Readiness: Acceptance  Method: Explanation  Response: Verbalizes Understanding  Comment: OT role and POC, safe transfer technique, spinal precautions and functional implications        Session Outcome  Patient reclined, in bed at end of session, bed alarm on, all needs met, call light in reach, personal items in reach. Nursing notified about patient's performance and patient's position.    AM-PAC™ - ADL (Current Function)     Putting on/taking off regular lower body clothing 3 - A Little   Bathing 3 - A Little   Toileting 3 - A Little   Putting on/taking off regular upper body clothing 4 - None   Help for taking care of personal grooming 3 - A Little   Eating meals 4 - None   AM-PAC™ ADL Score 20      ASSESSMENT AND PLAN     Progress Summary  Pt seen post op day 1 s/p C2-C5 PCDF. Pt reports baseline use pf SPC and independence in ADLS. Pt reports that this is her 3rd cervical surgery and so she voiced knowledge of spinal precautions and functional implications, although these were reiterated at start of eval. Pt completed STS with supervision, toilet transfer and toileting tasks with supervision, oral care and LBD task with supervision and with pt using RW. Pt reported a significant headache during eval, with pt reporting that she has had this headache since surgery. RN was notified and pt was returned to lying in bed. Pt did report reduction of symptoms with lying. OT will continue to work with the pt to maximize her functional independence prior to dc and anticipates that when medically stable that pt will dc home with IADl assist from her spouse.    Patient/Family Therapy Goal Statement: go home    OT Plan      Flowsheet Row Most Recent Value   Rehab Potential good, to achieve stated therapy goals at 04/20/2024 0925   Therapy Frequency daily at 04/20/2024 0925   Planned Therapy Interventions activity tolerance training, occupation/activity based interventions, patient/caregiver education/training,  transfer/mobility retraining, BADL retraining at 04/20/2024 0925            OT Discharge Recommendations      Flowsheet Row Most Recent Value   OT Recommended Discharge Disposition home with assistance  [IADL assist] at 04/20/2024 0925   Anticipated Equipment Needs if Discharged Home (OT) none at 04/20/2024 0925                 OT Goals      Flowsheet Row Most Recent Value   Transfer Goal 1    Activity/Assistive Device toilet at 04/20/2024 0925   [place] modified independence at 04/20/2024 0925   Time Frame by discharge at 04/20/2024 0925   Progress/Outcome goal ongoing at 04/20/2024 0925   Transfer Goal 2    Activity/Assistive Device shower at 04/20/2024 0925   [place] modified independence at 04/20/2024 0925   Time Frame by discharge at 04/20/2024 0925   Progress/Outcome goal ongoing at 04/20/2024 0925   Dressing Goal 1    Activity/Adaptive Equipment dressing skills, all at 04/20/2024 0925   [place] modified independence at 04/20/2024 0925   Time Frame by discharge at 04/20/2024 0925   Progress/Outcome goal ongoing at 04/20/2024 0925

## 2024-04-21 LAB
ANION GAP SERPL CALC-SCNC: 13 MEQ/L (ref 3–15)
BUN SERPL-MCNC: 8 MG/DL (ref 7–25)
CALCIUM SERPL-MCNC: 9.2 MG/DL (ref 8.6–10.3)
CHLORIDE SERPL-SCNC: 101 MEQ/L (ref 98–107)
CO2 SERPL-SCNC: 26 MEQ/L (ref 21–31)
CREAT SERPL-MCNC: 0.6 MG/DL (ref 0.6–1.2)
EGFRCR SERPLBLD CKD-EPI 2021: >60 ML/MIN/1.73M*2
ERYTHROCYTE [DISTWIDTH] IN BLOOD BY AUTOMATED COUNT: 13.4 % (ref 11.7–14.4)
GLUCOSE SERPL-MCNC: 135 MG/DL (ref 70–99)
HCT VFR BLD AUTO: 36.4 % (ref 35–45)
HGB BLD-MCNC: 12.1 G/DL (ref 11.8–15.7)
MCH RBC QN AUTO: 28.6 PG (ref 28–33.2)
MCHC RBC AUTO-ENTMCNC: 33.2 G/DL (ref 32.2–35.5)
MCV RBC AUTO: 86.1 FL (ref 83–98)
PDW BLD AUTO: 9.7 FL (ref 9.4–12.3)
PLATELET # BLD AUTO: 405 K/UL (ref 150–369)
POTASSIUM SERPL-SCNC: 3.5 MEQ/L (ref 3.5–5.1)
RBC # BLD AUTO: 4.23 M/UL (ref 3.93–5.22)
SODIUM SERPL-SCNC: 140 MEQ/L (ref 136–145)
TROPONIN I SERPL HS-MCNC: 8.8 PG/ML
WBC # BLD AUTO: 10.46 K/UL (ref 3.8–10.5)

## 2024-04-21 PROCEDURE — 63700000 HC SELF-ADMINISTRABLE DRUG: Performed by: STUDENT IN AN ORGANIZED HEALTH CARE EDUCATION/TRAINING PROGRAM

## 2024-04-21 PROCEDURE — 63600000 HC DRUGS/DETAIL CODE: Mod: JZ | Performed by: NURSE PRACTITIONER

## 2024-04-21 PROCEDURE — 85027 COMPLETE CBC AUTOMATED: CPT | Performed by: STUDENT IN AN ORGANIZED HEALTH CARE EDUCATION/TRAINING PROGRAM

## 2024-04-21 PROCEDURE — 25800000 HC PHARMACY IV SOLUTIONS: Performed by: NURSE PRACTITIONER

## 2024-04-21 PROCEDURE — 36415 COLL VENOUS BLD VENIPUNCTURE: CPT | Performed by: STUDENT IN AN ORGANIZED HEALTH CARE EDUCATION/TRAINING PROGRAM

## 2024-04-21 PROCEDURE — 93005 ELECTROCARDIOGRAM TRACING: CPT | Performed by: STUDENT IN AN ORGANIZED HEALTH CARE EDUCATION/TRAINING PROGRAM

## 2024-04-21 PROCEDURE — 63600000 HC DRUGS/DETAIL CODE: Mod: JZ | Performed by: STUDENT IN AN ORGANIZED HEALTH CARE EDUCATION/TRAINING PROGRAM

## 2024-04-21 PROCEDURE — 80048 BASIC METABOLIC PNL TOTAL CA: CPT | Performed by: STUDENT IN AN ORGANIZED HEALTH CARE EDUCATION/TRAINING PROGRAM

## 2024-04-21 PROCEDURE — 12000000 HC ROOM AND CARE MED/SURG

## 2024-04-21 PROCEDURE — 84484 ASSAY OF TROPONIN QUANT: CPT | Performed by: STUDENT IN AN ORGANIZED HEALTH CARE EDUCATION/TRAINING PROGRAM

## 2024-04-21 PROCEDURE — 99232 SBSQ HOSP IP/OBS MODERATE 35: CPT | Performed by: STUDENT IN AN ORGANIZED HEALTH CARE EDUCATION/TRAINING PROGRAM

## 2024-04-21 PROCEDURE — 97116 GAIT TRAINING THERAPY: CPT | Mod: GP,CQ

## 2024-04-21 PROCEDURE — 63700000 HC SELF-ADMINISTRABLE DRUG: Performed by: NURSE PRACTITIONER

## 2024-04-21 PROCEDURE — 97535 SELF CARE MNGMENT TRAINING: CPT | Mod: GO

## 2024-04-21 RX ORDER — OXYCODONE HYDROCHLORIDE 10 MG/1
10 TABLET ORAL EVERY 4 HOURS PRN
Qty: 30 TABLET | Refills: 0 | Status: SHIPPED | OUTPATIENT
Start: 2024-04-21 | End: 2024-04-26

## 2024-04-21 RX ORDER — ACETAMINOPHEN 325 MG/1
650 TABLET ORAL
Status: DISCONTINUED | OUTPATIENT
Start: 2024-04-21 | End: 2024-04-22 | Stop reason: HOSPADM

## 2024-04-21 RX ORDER — HYDRALAZINE HYDROCHLORIDE 25 MG/1
50 TABLET, FILM COATED ORAL ONCE
Status: COMPLETED | OUTPATIENT
Start: 2024-04-22 | End: 2024-04-22

## 2024-04-21 RX ORDER — ALUMINUM HYDROXIDE, MAGNESIUM HYDROXIDE, AND SIMETHICONE 1200; 120; 1200 MG/30ML; MG/30ML; MG/30ML
30 SUSPENSION ORAL ONCE
Status: COMPLETED | OUTPATIENT
Start: 2024-04-21 | End: 2024-04-21

## 2024-04-21 RX ORDER — POLYETHYLENE GLYCOL 3350 17 G/17G
17 POWDER, FOR SOLUTION ORAL DAILY
Qty: 14 PACKET | Refills: 0 | Status: SHIPPED | OUTPATIENT
Start: 2024-04-22 | End: 2024-05-06

## 2024-04-21 RX ORDER — SENNOSIDES 8.6 MG/1
1 TABLET ORAL DAILY
Qty: 14 TABLET | Refills: 0 | Status: SHIPPED | OUTPATIENT
Start: 2024-04-21 | End: 2024-05-05

## 2024-04-21 RX ORDER — TIZANIDINE 4 MG/1
4 TABLET ORAL EVERY 6 HOURS PRN
Qty: 28 TABLET | Refills: 0 | Status: SHIPPED | OUTPATIENT
Start: 2024-04-21 | End: 2024-04-28

## 2024-04-21 RX ORDER — BUTALBITAL, ACETAMINOPHEN AND CAFFEINE 50; 325; 40 MG/1; MG/1; MG/1
1 TABLET ORAL EVERY 4 HOURS PRN
Status: DISCONTINUED | OUTPATIENT
Start: 2024-04-21 | End: 2024-04-22 | Stop reason: HOSPADM

## 2024-04-21 RX ORDER — METOCLOPRAMIDE HYDROCHLORIDE 5 MG/ML
10 INJECTION INTRAMUSCULAR; INTRAVENOUS EVERY 6 HOURS PRN
Status: DISCONTINUED | OUTPATIENT
Start: 2024-04-21 | End: 2024-04-21

## 2024-04-21 RX ORDER — HYDRALAZINE HYDROCHLORIDE 25 MG/1
50 TABLET, FILM COATED ORAL EVERY 8 HOURS
Status: DISCONTINUED | OUTPATIENT
Start: 2024-04-21 | End: 2024-04-21

## 2024-04-21 RX ORDER — METOCLOPRAMIDE 10 MG/1
10 TABLET ORAL EVERY 6 HOURS PRN
Status: DISCONTINUED | OUTPATIENT
Start: 2024-04-21 | End: 2024-04-21

## 2024-04-21 RX ORDER — HYDRALAZINE HYDROCHLORIDE 25 MG/1
50 TABLET, FILM COATED ORAL ONCE
Status: COMPLETED | OUTPATIENT
Start: 2024-04-21 | End: 2024-04-21

## 2024-04-21 RX ADMIN — TIZANIDINE 4 MG: 4 TABLET ORAL at 09:48

## 2024-04-21 RX ADMIN — ALUMINUM HYDROXIDE, MAGNESIUM HYDROXIDE, AND DIMETHICONE 30 ML: 200; 20; 200 SUSPENSION ORAL at 16:14

## 2024-04-21 RX ADMIN — VALSARTAN: 160 TABLET ORAL at 08:12

## 2024-04-21 RX ADMIN — BUTALBITAL, ACETAMINOPHEN AND CAFFEINE 1 TABLET: 325; 50; 40 TABLET ORAL at 21:22

## 2024-04-21 RX ADMIN — OXYCODONE HYDROCHLORIDE 15 MG: 5 TABLET ORAL at 08:13

## 2024-04-21 RX ADMIN — BUTALBITAL, ACETAMINOPHEN AND CAFFEINE 1 TABLET: 325; 50; 40 TABLET ORAL at 11:45

## 2024-04-21 RX ADMIN — CEFAZOLIN 2 G: 2 INJECTION, POWDER, FOR SOLUTION INTRAMUSCULAR; INTRAVENOUS at 08:13

## 2024-04-21 RX ADMIN — FLUOXETINE HYDROCHLORIDE 60 MG: 20 CAPSULE ORAL at 08:13

## 2024-04-21 RX ADMIN — METOPROLOL TARTRATE 75 MG: 50 TABLET, FILM COATED ORAL at 08:12

## 2024-04-21 RX ADMIN — TRIMETHOBENZAMIDE HYDROCHLORIDE 200 MG: 100 INJECTION INTRAMUSCULAR at 18:58

## 2024-04-21 RX ADMIN — PILOCARPINE HYDROCHLORIDE 5 MG: 5 TABLET, FILM COATED ORAL at 08:12

## 2024-04-21 RX ADMIN — ACETAMINOPHEN 975 MG: 325 TABLET ORAL at 06:13

## 2024-04-21 RX ADMIN — MONTELUKAST 10 MG: 10 TABLET, FILM COATED ORAL at 21:22

## 2024-04-21 RX ADMIN — ONDANSETRON 4 MG: 4 TABLET, ORALLY DISINTEGRATING ORAL at 17:24

## 2024-04-21 RX ADMIN — LUBIPROSTONE 24 MCG: 24 CAPSULE, GELATIN COATED ORAL at 08:12

## 2024-04-21 RX ADMIN — ACETAMINOPHEN 650 MG: 325 TABLET ORAL at 14:45

## 2024-04-21 RX ADMIN — METOPROLOL TARTRATE 75 MG: 50 TABLET, FILM COATED ORAL at 20:19

## 2024-04-21 RX ADMIN — ATORVASTATIN CALCIUM 20 MG: 20 TABLET, FILM COATED ORAL at 08:13

## 2024-04-21 RX ADMIN — ONDANSETRON 4 MG: 2 INJECTION INTRAMUSCULAR; INTRAVENOUS at 09:48

## 2024-04-21 RX ADMIN — OXYCODONE HYDROCHLORIDE 10 MG: 5 TABLET ORAL at 00:52

## 2024-04-21 RX ADMIN — PANTOPRAZOLE SODIUM 40 MG: 40 TABLET, DELAYED RELEASE ORAL at 08:13

## 2024-04-21 RX ADMIN — ACETAMINOPHEN 650 MG: 325 TABLET ORAL at 21:22

## 2024-04-21 RX ADMIN — PILOCARPINE HYDROCHLORIDE 5 MG: 5 TABLET, FILM COATED ORAL at 20:19

## 2024-04-21 RX ADMIN — MOMETASONE FUROATE AND FORMOTEROL FUMARATE DIHYDRATE 2 PUFF: 100; 5 AEROSOL RESPIRATORY (INHALATION) at 08:14

## 2024-04-21 RX ADMIN — PANTOPRAZOLE SODIUM 40 MG: 40 TABLET, DELAYED RELEASE ORAL at 16:33

## 2024-04-21 RX ADMIN — ONDANSETRON 4 MG: 2 INJECTION INTRAMUSCULAR; INTRAVENOUS at 01:21

## 2024-04-21 RX ADMIN — HYDRALAZINE HYDROCHLORIDE 50 MG: 25 TABLET ORAL at 16:33

## 2024-04-21 ASSESSMENT — COGNITIVE AND FUNCTIONAL STATUS - GENERAL
CLIMB 3 TO 5 STEPS WITH RAILING: 3 - A LITTLE
CLIMB 3 TO 5 STEPS WITH RAILING: 3 - A LITTLE
WALKING IN HOSPITAL ROOM: 3 - A LITTLE
HELP NEEDED FOR PERSONAL GROOMING: 4 - NONE
DRESSING REGULAR UPPER BODY CLOTHING: 4 - NONE
MOVING TO AND FROM BED TO CHAIR: 3 - A LITTLE
AFFECT: WFL
WALKING IN HOSPITAL ROOM: 4 - NONE
HELP NEEDED FOR BATHING: 4 - NONE
CLIMB 3 TO 5 STEPS WITH RAILING: 4 - NONE
STANDING UP FROM CHAIR USING ARMS: 4 - NONE
MOVING TO AND FROM BED TO CHAIR: 4 - NONE
MOVING TO AND FROM BED TO CHAIR: 4 - NONE
STANDING UP FROM CHAIR USING ARMS: 3 - A LITTLE
STANDING UP FROM CHAIR USING ARMS: 4 - NONE
EATING MEALS: 4 - NONE
WALKING IN HOSPITAL ROOM: 4 - NONE
DRESSING REGULAR LOWER BODY CLOTHING: 4 - NONE
TOILETING: 4 - NONE

## 2024-04-21 NOTE — PROGRESS NOTES
Physical Therapy -  Daily Treatment/Progress Note     Patient: Jeannette Drummond  Location: [place] [address]  MRN: [id number]  Today's date: 4/21/2024    HISTORY OF PRESENT ILLNESS     Jeannette is a 69 y.o. female admitted on 4/19/2024 with Cervical spondylosis with myelopathy [M47.12]  Cervical spinal stenosis [M48.02]  Spinal stenosis [M48.00]. Principal problem is Stenosis of cervical spine with myelopathy (CMS/HCC).    Past Medical History  Jeannette has a past medical history of Asthma, Fibromyalgia, GERD (gastroesophageal reflux disease), Hypertension, Lung abscess (CMS/HCC), Oral dryness, Osteoarthritis, Scleroma, and Transfusion history.    History of Present Illness   Pt underwent elective PCDF revision on 4/19 with Dr. Rodriguez    Per ortho note, cervical collar to be worn at all times  PRIOR LEVEL OF FUNCTION AND LIVING ENVIRONMENT     Prior Level of Function      Flowsheet Row Most Recent Value   Dominant Hand right   Ambulation assistive equipment   Transferring assistive equipment   Toileting independent   Bathing independent   Dressing independent   Eating independent   IADLs assistive person   Driving/Transportation friends/family   Communication understands/communicates without difficulty   Prior Level of Function Comment Pt reports use of SPC and independence with ADLs. Pt reports that she and her  share IADL responsibilities and her  provides community transport   Assistive Device Currently Used at Home cane, straight, raised toilet, grab bar, shower chair  [pt reports that she has a RW, sock aid, and reacher at home]          Prior Living Environment      Flowsheet Row Most Recent Value   People in Home spouse   Current Living Arrangements condominium   Home Accessibility wheelchair accessible   Living Environment Comment pt reports that she lives with her spouse in a condo with 0STE, one floor living. Pt reports that she .sleeps in a recliner, has a WIS with sc and gb and RTS with  gb          VITALS AND PAIN     PT Vitals      Date/Time Pulse SpO2 Pt Activity O2 Therapy BP BP Location BP Method Pt Position [facility]   04/21/24 1315 77 100 % At rest None (Room air) 150/56 Right upper arm Automatic Lying KL          PT Pain      Date/Time Pain Type Rating: Rest Rating: Activity [facility]   04/21/24 1315 Pain Assessment 0 - no pain 0 - no pain KL             Objective   OBJECTIVE     Start time:  1315  End time:  1341  Session Length: 26 min  Mode of Treatment: co-treatment, physical therapy (co-tx to expedite DC)    General Observations  Patient received upright, in bed. She was agreeable to therapy, no issues or concerns identified by nurse prior to session.      Precautions: fall, spinal, brace worn at all times              PT Eval and Treat - 04/21/24 1314          Cognition    Orientation Status oriented x 4        Bed Mobility    Bed Mobility Activities supine to sit;sit to supine     [place] modified independence     Safety/Cues increased time to complete     Assistive Device none        Mobility Belt    Mobility Belt Used for All Out of Bed Activity yes        Sit/Stand Transfer    Surface chair with arm rests     [place] modified independence     Safety/Cues increased time to complete     Assistive Device walker, front-wheeled        Car Transfer    Transfer Technique sit-stand;stand-sit     [place] modified independence     Safety/Cues increased time to complete     Assistive Device walker, front-wheeled        Gait Training    [place], Gait modified independence     Safety/Cues increased time to complete     Assistive Device walker, front-wheeled     Distance in Feet 200 feet     Pattern step-through     Deviations/Abnormal Patterns gait speed decreased;step length decreased     Comment (Gait/Stairs) 200' X 2 RW mod I        Stairs Training    Comment pt stated she has no stairs not even to enter her home        Balance    Static Sitting Balance WFL     Dynamic Sitting  Balance WFL     Sit to Stand Dynamic Balance WFL     Static Standing Balance WFL     Dynamic Standing Balance WFL                            Orthosis Neck Cervical Collar (Active)   Date Obtained: 04/19/24   Location: Neck  Type: Cervical Collar  Features: Hard  Therapeutic Indications: stabilization and support  Wearing Schedule: wear at all times (remove only for hygiene and skin inspection)     Orthosis Neck Cervical Collar (Active)   Skin Assessment no redness;no breakdown 04/21/24 0858          Session Outcome  Patient upright, in bed at end of session, bed alarm on, all needs met, call light in reach, personal items in reach. Nursing notified about patient's performance, patient's position, and patient's response to therapy/activity.    AM-PAC™ - Mobility (Current Function)     Turning form your back to your side while in flat bed without using bedrails 4 - None   Moving from lying on your back to sitting on the side of a flat bed without using bedrails 4 - None   Moving to and from a bed to a chair 4 - None   Standing up from a chair using your arms 4 - None   To walk in a hospital room 4 - None   Climbing 3-5 steps with a railing 4 - None   AM-PAC™ Mobility Score 24      ASSESSMENT AND PLAN     Progress Summary  pt mod I with transfers and gait using RW, pt stated she has help at home if needed and that she has a RW, pt with no questions or concerns by end of tx    Patient/Family Therapy Goals Statement: I want go home    PT Plan      Flowsheet Row Most Recent Value   Rehab Potential good, to achieve stated therapy goals at 04/20/2024 0952   Therapy Frequency daily at 04/20/2024 0952   Planned Therapy Interventions balance training, bed mobility training, gait training, patient/family education, stair training, transfer training at 04/20/2024 0952            PT Discharge Recommendations      Flowsheet Row Most Recent Value   PT Recommended Discharge Disposition home with assistance at 04/20/2024 0952    Anticipated Equipment Needs if Discharged Home (PT) none at 04/20/2024 0952                 PT Goals      Flowsheet Row Most Recent Value   Transfer Goal 1    Activity/Assistive Device car transfer, walker, front-wheeled at 04/20/2024 0952   [place] supervision required at 04/20/2024 0952   Time Frame by discharge at 04/20/2024 0952   Progress/Outcome goal ongoing at 04/20/2024 0952   Gait Training Goal 1    Activity/Assistive Device gait (walking locomotion), walker, front-wheeled at 04/20/2024 0952   [place] modified independence at 04/20/2024 0952   Distance 80 at 04/20/2024 0952   Time Frame by discharge at 04/20/2024 0952   Progress/Outcome goal ongoing at 04/20/2024 0952

## 2024-04-21 NOTE — PROGRESS NOTES
Occupational Therapy -  Daily Treatment/Progress Note     Patient: Jeannette Drummond  Location: [place] [address]  MRN: [id number]  Today's date: 4/21/2024    HISTORY OF PRESENT ILLNESS     Jeannette is a 69 y.o. female admitted on 4/19/2024 with Cervical spondylosis with myelopathy [M47.12]  Cervical spinal stenosis [M48.02]  Spinal stenosis [M48.00]. Principal problem is Stenosis of cervical spine with myelopathy (CMS/HCC).    Past Medical History  Jeannette has a past medical history of Asthma, Fibromyalgia, GERD (gastroesophageal reflux disease), Hypertension, Lung abscess (CMS/HCC), Oral dryness, Osteoarthritis, Scleroma, and Transfusion history.    History of Present Illness   Pt underwent elective PCDF revision on 4/19 with Dr. Rodriguez    Per ortho note, cervical collar to be worn at all times  PRIOR LEVEL OF FUNCTION AND LIVING ENVIRONMENT     Prior Level of Function      Flowsheet Row Most Recent Value   Dominant Hand right   Ambulation assistive equipment   Transferring assistive equipment   Toileting independent   Bathing independent   Dressing independent   Eating independent   IADLs assistive person   Driving/Transportation friends/family   Communication understands/communicates without difficulty   Prior Level of Function Comment Pt reports use of SPC and independence with ADLs. Pt reports that she and her  share IADL responsibilities and her  provides community transport   Assistive Device Currently Used at Home cane, straight, raised toilet, grab bar, shower chair  [pt reports that she has a RW, sock aid, and reacher at home]          Prior Living Environment      Flowsheet Row Most Recent Value   People in Home spouse   Current Living Arrangements condominium   Home Accessibility wheelchair accessible   Living Environment Comment pt reports that she lives with her spouse in a condo with 0STE, one floor living. Pt reports that she .sleeps in a recliner, has a WIS with sc and gb and RTS  with gb          Occupational Profile      Flowsheet Row Most Recent Value   Environmental Supports and Barriers supportive spouse          VITALS AND PAIN     OT Vitals      Date/Time Pulse SpO2 Pt Activity O2 Therapy BP BP Location BP Method Pt Position [facility]   04/21/24 1315 77 100 % At rest None (Room air) 150/56 Right upper arm Automatic Lying KL          OT Pain      Date/Time Pain Type Rating: Rest Rating: Activity [facility]   04/21/24 1315 Pain Assessment 0 - no pain 0 - no pain KL             Objective   OBJECTIVE     Start time:  1315  End time:  1341  Session Length: 26 min  Mode of Treatment: occupational therapy, co-treatment    General Observations  Patient received supine, in bed. She was agreeable to therapy, no issues or concerns identified by nurse prior to session. resting upon arrival, easily aroused by spouse at bedside. Wing arango donned.    Precautions: fall, spinal, brace worn at all times        Services  Do You Speak a Language Other Than English at Home?: no  Is an  Needed/Used?: No      OT Eval and Treat - 04/21/24 1315          Cognition    Orientation Status oriented x 3     Affect/Mental Status WFL     Follows Commands WFL     Cognitive Function WFL     Comment, Cognition able to maintain c-spine px during encounter        Bed Mobility    Bed Mobility Activities supine to sit;sit to supine     [place] modified independence     Safety/Cues increased time to complete;verbal cues;sequencing;technique     Assistive Device head of bed elevated     Comment educated on log roll technique, fair understanding however not performed to full extent. Able to transfer in/out of bed without PA and maintain px.        Mobility Belt    Mobility Belt Used for All Out of Bed Activity yes        Sit/Stand Transfer    Surface chair with arm rests;edge of bed     [place] modified independence     Safety/Cues increased time to complete;verbal cues;hand placement;technique      Assistive Device walker, front-wheeled        Toilet Transfer    Transfer Technique sit/stand     [place] modified independence     Safety/Cues increased time to complete;verbal cues;hand placement;technique     Assistive Device grab bars/safety frame     Comment stadard height toilet iwth R grab bar        Shower/Tub Transfer    Transfer Type Shower     Transfer Technique step over, right entry     [place] modified independence     Safety/Cues increased time to complete;verbal cues;hand placement;technique;sequencing     Assistive Device walker, front-wheeled;grab bars/tub rail     Comment use of therapist as grab bar        Functional Mobility    Distance in room/bathroom;hallway     Functional Mobility [place] modified independence     Safety/Cues increased time to complete     Assistive Device walker, front-wheeled     Functional Mobility Comments household distance completed with RW        Upper Body Dressing    Comment educated and demonstrated on technique to maintain c-spine px, Pt verbalized understanding.        Lower Body Dressing    Tasks don;doff;socks     [place] modified independence     Safety/Cues increased time to complete     Position supported sitting     Adaptive Equipment none     Comment figure four technique performed        Grooming    Tasks washes, rinses and dries face;oral care (brushing teeth, cleaning dentures)     [place] modified independence     Safety/Cues increased time to complete;proper use of assistive device     Position sink side;supported standing     Adaptive Equipment none        Toileting    Tasks adjust/manage clothing;perform bladder hygiene     [place] modified independence     Position supported sitting     Adaptive Equipment none     Comment +voided        Balance    Static Sitting Balance WFL     Dynamic Sitting Balance WFL     Sit to Stand Dynamic Balance WFL     Static Standing Balance WFL     Dynamic Standing Balance WFL     Comment,  Balance no LOB, use of RW        Impairments/Safety Issues    Impairments Affecting Function pain     Functional Endurance good                            Orthosis Neck Cervical Collar (Active)   Date Obtained: 04/19/24   Location: Neck  Type: Cervical Collar  Features: Hard  Therapeutic Indications: stabilization and support  Wearing Schedule: wear at all times (remove only for hygiene and skin inspection)     Orthosis Neck Cervical Collar (Active)   Skin Assessment no redness;no breakdown 04/21/24 0858          Session Outcome  Patient reclined, in bed at end of session, bed alarm on, call light in reach, personal items in reach, all needs met. Nursing notified about patient's position, patient's performance, and patient's response to therapy/activity.    AM-PAC™ - ADL (Current Function)     Putting on/taking off regular lower body clothing 4 - None   Bathing 4 - None   Toileting 4 - None   Putting on/taking off regular upper body clothing 4 - None   Help for taking care of personal grooming 4 - None   Eating meals 4 - None   AM-PAC™ ADL Score 24      ASSESSMENT AND PLAN     Progress Summary  Seen for OT tx, rec'd supine in bed. Performed bed mobility withotu pA, able to sit unsupported. Educated on log roll technique and observed fair carry over. Mod I during functional STS from EOB, chair, and standard height toilet. Use of RW during mobility trial, able to complete household distances. Performed grooming activities standing supported at sink and LB dressing management seated in chair. Pt limited due to pain from headache, supportive spouse present during encounter and able to help as needed. No acute OT need at this time. Rec home when medically able    Patient/Family Therapy Goal Statement: go home    Reason for Discharge: patient met all goals and outcomes, no further needs identified    OT Discharge Recommendations      Flowsheet Row Most Recent Value   OT Recommended Discharge Disposition home with  assistance at 04/21/2024 1315   Anticipated Equipment Needs if Discharged Home (OT) none  [has all anticpated equipment] at 04/21/2024 1315                 OT Goals      Flowsheet Row Most Recent Value   Transfer Goal 1    Activity/Assistive Device toilet at 04/20/2024 0925   [place] modified independence at 04/20/2024 0925   Time Frame by discharge at 04/20/2024 0925   Progress/Outcome goal met at 04/21/2024 1315   Transfer Goal 2    Activity/Assistive Device shower at 04/20/2024 0925   [place] modified independence at 04/20/2024 0925   Time Frame by discharge at 04/20/2024 0925   Progress/Outcome goal met at 04/21/2024 1315   Dressing Goal 1    Activity/Adaptive Equipment dressing skills, all at 04/20/2024 0925   [place] modified independence at 04/20/2024 0925   Time Frame by discharge at 04/20/2024 0925   Progress/Outcome goal met at 04/21/2024 1315

## 2024-04-21 NOTE — NURSING NOTE
Pt c/o chest pain after CHARLES drain removed by Dr. Dickens. EKG completed. /72. Dr. Do at bedside to see pt. STAT troponin sent. Maalox administered.

## 2024-04-21 NOTE — PROGRESS NOTES
Orthopaedic Spine Surgery Progress Note    Interval History:  No acute events. Pain controlled. Headache yesterday, not positional and improving this morning.    Vital Signs:  Vitals:    04/21/24 0754   BP: (!) 165/76   Pulse: 84   Resp: 18   Temp: 36.8 °C (98.3 °F)   SpO2: 96%       Physical Exam:  Dressing: Clean, dry, intact.  Drain: SS output    Right Upper Extremity:  Inspection: No gross deformity. Skin intact.  Neurovascular: Palpable radial pulse. SILT C4-T1 distribution.  Motor: Deltoid 5/5, biceps 5/5, triceps 5/5, wrist extensors 5/5, hand intrinsics 5/5    Left Upper Extremity:  Inspection: No gross deformity. Skin intact.  Neurovascular: Palpable radial pulse. SILT C4-T1 distribution.  Motor: Deltoid 5/5, biceps 5/5, triceps 5/5, wrist extensors 5/5, hand intrinsics 5/5    Right Lower Extremity:  Inspection: No gross deformity. Skin intact.   Neurovascular: Palpable DP pulse. SILT L2-S1 distibution.  Motor: IP 5/5, Quad 5/5, TA 5/5, EHL 5/5, GS 5/5    Left Lower Extremity:  Inspection: No gross deformity. Skin intact.   Neurovascular: Palpable DP pulse. SILT L2-S1 distibution.  Motor: IP 5/5, Quad 5/5, TA 5/5, EHL 5/5, GS 5/5    Assessment and Plan  Jeannette Drummond is a 69 y.o. female s/p Procedure(s):  C2-5 posterior cervical decompression and fusion instrumentation allograft autograft revision of existing instrumentation     -- Pain control  -- SCDs  -- Advance diet as tolerated  -- Monitor drain output > likely to DC pending PT progress  -- Abx  -- PT/OT  -- Monitor neurovascular status  -- Medical co-management    Zachary Dickens MD  Orthopaedic Surgery

## 2024-04-21 NOTE — PLAN OF CARE
Plan of Care Review  Outcome Evaluation: Patient slept for most of the shift. Call bell within reach. Alarm activated.

## 2024-04-21 NOTE — PROGRESS NOTES
Hospital Medicine Service -  Daily Progress Note       SUBJECTIVE   Interval History: Patient was seen and examined at bedside. She reports improvement in headache but still rates it 10/10.      OBJECTIVE      Vital signs in last 24 hours:  Temp:  [36.2 °C (97.1 °F)-36.8 °C (98.3 °F)] 36.8 °C (98.3 °F)  Heart Rate:  [77-90] 77  Resp:  [18] 18  BP: (136-169)/(53-86) 150/56    Intake/Output Summary (Last 24 hours) at 4/21/2024 1346  Last data filed at 4/21/2024 0515  Gross per 24 hour   Intake --   Output 90 ml   Net -90 ml       PHYSICAL EXAMINATION      Physical Exam  Gen Well appearing, comfortable, NAD. Obese, AAOX3  HEENT  Neck collar and CHARLES drain in place   CV RR norm s1 and s2 no mrg  Pulm  CTA Bl.  No WRR  Abd +BS soft, NT, ND.    Ext NT, No LE edema  Neuro Non focal  Psych  Full range affect.     LINES, CATHETERS, DRAINS, AIRWAYS, AND WOUNDS   Lines, Drains, and Airways:  Wounds (agree with documentation and present on admission):  Peripheral IV (Adult) 04/19/24 Posterior;Right Hand (Active)   Number of days: 2       Drain Posterior Neck 15 Fr. (Active)   Number of days: 2       Surgical Incision Neck (Active)   Number of days: 2         Comments:      LABS / IMAGING / TELE      Labs  I independently reviewed all pertinent labs    CBC Results         04/21/24 04/20/24 04/11/24     0944 0533 1141    WBC 10.46 16.03 9.82    RBC 4.23 4.21 4.34    HGB 12.1 11.9 12.3    HCT 36.4 37.0 37.8    MCV 86.1 87.9 87.1    MCH 28.6 28.3 28.3    MCHC 33.2 32.2 32.5     351 340           BMP Results         04/21/24 04/20/24 04/11/24     0944 0533 1141     138 137    K 3.5 4.4 4.6    Cl 101 104 100    CO2 26 26 31    Glucose 135 125 93    BUN 8 8 9    Creatinine 0.6 0.5 0.7    Calcium 9.2 8.2 8.8    Anion Gap 13 8 6    EGFR >60.0 >60.0 >60.0           Comment for EGFR at 0944 on 04/21/24: Calculation based on the Chronic Kidney Disease Epidemiology Collaboration (CKD-EPI) equation refit without adjustment for  race.    Comment for EGFR at 0533 on 04/20/24: Calculation based on the Chronic Kidney Disease Epidemiology Collaboration (CKD-EPI) equation refit without adjustment for race.    Comment for EGFR at 1141 on 04/11/24: Calculation based on the Chronic Kidney Disease Epidemiology Collaboration (CKD-EPI) equation refit without adjustment for race.           Imaging  No new imaging     ECG/Telemetry  Med-surg floor     ASSESSMENT AND PLAN      Leukocytosis  Assessment & Plan  Post-op, reactive    Resolved     Frontal headache  Assessment & Plan  Frontal and occipital headache that started on 4/20   Likely post-op and hypertension related   Pain control with scheduled tylenol, prn zanaflex, prn roxicodone and prn fioricet     Prediabetes  Assessment & Plan  Last A1c 5.6 on 4/11/24  Hold home Januvia. Resume on discharge   Diabetic diet inpatient       Urgency incontinence  Assessment & Plan  Hold vesicare in the immediate pos-op period     Benign essential HTN  Assessment & Plan  BP now improved   Likely a component of post-op pain   C/w home lopressor and Exforge   Uptitrate meds as able     CAD (coronary artery disease)  Assessment & Plan  C/w statin. Defer resumption of aspirin to primary team   C/w lopressor     Mood disorder (CMS/[facility])  Assessment & Plan  C/w Prozac     Irritable bowel syndrome with constipation  Assessment & Plan  C/w Amitiza     GERD (gastroesophageal reflux disease)  Assessment & Plan  C/w PPI     Asthma  Assessment & Plan  Not in acute exacerbation   C/w singulair and dulera     Sicca syndrome (CMS/[facility])  Assessment & Plan  C/w cevimeline (formulary alternative)    * Stenosis of cervical spine with myelopathy (CMS/[facility])  Assessment & Plan  s/p PCDF C2-5 revision on 4/19   Pain management, nataly-op antibiotics, infante management and DVT prophylaxis per primary team   PT/OT/OOB  Cervical collar in place   Drain in place            VTE Assessment: [place]    VTE Prophylaxis:  Current  anticoagulants:    None      Code Status: Full Code      Estimated Discharge Date: 4/21/2024     Disposition Planning: Will follow while patient is hospitalized      Leydi Do MD  4/21/2024

## 2024-04-21 NOTE — PLAN OF CARE
I was notified that patient experienced transient chest pain during cervical drain removal. Bedside EKG was obtained which was non-ischemic. Patient describes it as burning pain in the lower sternal and upper epigastric region which has resolved by the time I arrived to examine her. She denies radiation to back, neck or UE. Denies associated nausea, vomiting, dyspnea or dizziness. BP was 178/72 at bedside. Will give a dose of 50 mg hydralazine x 1 dose and recheck BP prior to discharge.     Suspect reflux related epigastric pain rather than cardiac. Will give maalox x 1 dose. Will check a troponin level and monitor for the next 30 minutes to make sure the pain doesn't recur. Please make sure the troponin level comes back negative, she remains chest pain free for at least 30 minutes and BP improves to at least 150 SBP prior to discharge.

## 2024-04-21 NOTE — PLAN OF CARE
Care Coordination Discharge Plan Summary    Admission Assessment Summary    General Information  Readmission Within the last 30 days: no previous admission in last 30 days  Does patient have a :    Patient-Specific Goals (include timeframe): pt goal to return to home    Living Arrangements  Arrived From: home  Current Living Arrangements: condominium  People in Home: spouse  Home Accessibility: wheelchair accessible  Living Arrangement Comments: Pt lives with spouse in 1st floor condo with no steps to enter.    Social Determinants of Health - Screenings  Housing Stability  In the last 12 months, was there a time when you were not able to pay the mortgage or rent on time?: No  In the last 12 months, how many places have you lived?: 1  In the last 12 months, was there a time when you did not have a steady place to sleep or slept in a shelter (including now)?: No  Utility Access  In the past 12 months has the electric, gas, oil, or water company threatened to shut off services in your home?: No  Transportation Needs  In the past 12 months, has lack of transportation kept you from medical appointments or from getting medications?: No  In the past 12 months, has lack of transportation kept you from meetings, work, or from getting things needed for daily living?: No    Functional Status Prior to Admission  Assistive Device/Animal Currently Used at Home: cane, straight, raised toilet, grab bar, shower chair (pt reports that she has a RW, sock aid, and reacher at home)  Functional Status Comments: Pt was reportedly independent with functional status prior to hopsital  IADL Comments: Pt was reportedly independent with IADLS prior to hospital    Discharge Needs Assessment    Concerns to be Addressed: care coordination/care conferences  Current Discharge Risk:    Anticipated Changes Related to Illness: none    Discharge Plan Summary    Patient Choice          Concerns / Comments: Plan remains home with spouse  assistance.    Discharge Plan:  Disposition/Destination: Home  / Home       Connection to Community  Patient declined offered resources.  Community Resources:      Discharge Transportation:  Is Out of Hospital DNR needed at Discharge: no  Does patient need discharge transport?

## 2024-04-22 VITALS
BODY MASS INDEX: 42.14 KG/M2 | OXYGEN SATURATION: 96 % | HEIGHT: 62 IN | DIASTOLIC BLOOD PRESSURE: 66 MMHG | RESPIRATION RATE: 18 BRPM | TEMPERATURE: 97.7 F | SYSTOLIC BLOOD PRESSURE: 145 MMHG | WEIGHT: 229 LBS | HEART RATE: 77 BPM

## 2024-04-22 LAB
ATRIAL RATE: 84
P AXIS: 116
PR INTERVAL: 186
QRS DURATION: 96
QT INTERVAL: 404
QTC CALCULATION(BAZETT): 477
R AXIS: 162
T WAVE AXIS: 124
VENTRICULAR RATE: 84

## 2024-04-22 PROCEDURE — 63700000 HC SELF-ADMINISTRABLE DRUG: Performed by: NURSE PRACTITIONER

## 2024-04-22 PROCEDURE — 63700000 HC SELF-ADMINISTRABLE DRUG: Performed by: STUDENT IN AN ORGANIZED HEALTH CARE EDUCATION/TRAINING PROGRAM

## 2024-04-22 PROCEDURE — 94640 AIRWAY INHALATION TREATMENT: CPT

## 2024-04-22 RX ADMIN — METOPROLOL TARTRATE 75 MG: 50 TABLET, FILM COATED ORAL at 07:43

## 2024-04-22 RX ADMIN — TIZANIDINE 4 MG: 4 TABLET ORAL at 05:16

## 2024-04-22 RX ADMIN — VALSARTAN: 160 TABLET ORAL at 07:44

## 2024-04-22 RX ADMIN — PANTOPRAZOLE SODIUM 40 MG: 40 TABLET, DELAYED RELEASE ORAL at 07:43

## 2024-04-22 RX ADMIN — ATORVASTATIN CALCIUM 20 MG: 20 TABLET, FILM COATED ORAL at 07:43

## 2024-04-22 RX ADMIN — ACETAMINOPHEN 650 MG: 325 TABLET ORAL at 06:45

## 2024-04-22 RX ADMIN — MOMETASONE FUROATE AND FORMOTEROL FUMARATE DIHYDRATE 2 PUFF: 100; 5 AEROSOL RESPIRATORY (INHALATION) at 08:10

## 2024-04-22 RX ADMIN — HYDRALAZINE HYDROCHLORIDE 50 MG: 25 TABLET ORAL at 00:06

## 2024-04-22 RX ADMIN — BUTALBITAL, ACETAMINOPHEN AND CAFFEINE 1 TABLET: 325; 50; 40 TABLET ORAL at 03:33

## 2024-04-22 RX ADMIN — FLUOXETINE HYDROCHLORIDE 60 MG: 20 CAPSULE ORAL at 07:43

## 2024-04-22 RX ADMIN — PILOCARPINE HYDROCHLORIDE 5 MG: 5 TABLET, FILM COATED ORAL at 07:43

## 2024-04-22 RX ADMIN — LUBIPROSTONE 24 MCG: 24 CAPSULE, GELATIN COATED ORAL at 07:42

## 2024-04-22 ASSESSMENT — COGNITIVE AND FUNCTIONAL STATUS - GENERAL
CLIMB 3 TO 5 STEPS WITH RAILING: 4 - NONE
STANDING UP FROM CHAIR USING ARMS: 4 - NONE
WALKING IN HOSPITAL ROOM: 4 - NONE
MOVING TO AND FROM BED TO CHAIR: 4 - NONE

## 2024-04-22 NOTE — PLAN OF CARE
Plan of Care Review  Plan of Care Reviewed With: patient  Progress: improving  Outcome Evaluation: Pt AOx4, [place] collar in place, Ambulating to the bathroom with walker on standby assistance, BP elevated, HMS notified and hydralazine given PO.Call bell within reach, bed alarm set, bed in low position. SCDs on and functioning.

## 2024-04-22 NOTE — DISCHARGE SUMMARY
Ortho Discharge Summary    Admitting Provider: Jad Rodriguez MD  Discharge Provider: Jad Rodriguez MD  Primary Care Physician at Discharge: Divya Alvarado [phone]     Admission Date: 4/19/2024     Discharge Date: 4/22/2024    Primary Discharge Diagnosis  Stenosis of cervical spine with myelopathy (CMS/HCC)    Secondary Discharge Diagnosis      Discharge Disposition  Home   Code Status at Discharge: Full Code    Discharge Medications     Medication List        START taking these medications      oxyCODONE 10 mg immediate release tablet  Commonly known as: ROXICODONE  Take 1 tablet (10 mg total) by mouth every 4 (four) hours as needed for pain for up to 5 days.  Dose: 10 mg     polyethylene glycol 17 gram packet  Commonly known as: MIRALAX  Take 17 g by mouth daily for 14 days.  Dose: 17 g     senna 8.6 mg tablet  Commonly known as: SENOKOT  Take 1 tablet by mouth daily for 14 days.  Dose: 1 tablet     tiZANidine 4 mg tablet  Commonly known as: ZANAFLEX  Take 1 tablet (4 mg total) by mouth every 6 (six) hours as needed for muscle spasms for up to 7 days.  Dose: 4 mg            CONTINUE taking these medications      albuterol HFA 90 mcg/actuation inhaler  Inhale 2 puffs every 4 (four) hours as needed.  Dose: 2 puff     AMITIZA 24 mcg capsule  Take by mouth.  Generic drug: lubiprostone     amLODIPine 10 mg tablet 10 mg, valsartan 160 mg tablet 160 mg  Take by mouth daily.     aspirin 81 mg chewable tablet  Take 81 mg by mouth daily.  Dose: 81 mg     azelastine 137 mcg (0.1 %) nasal spray  Commonly known as: ASTELIN  Administer 137 mcg into affected nostril(s) 2 times daily.  Dose: 137 mcg     blood-glucose meter kit  by Other route. Use as instructed     BREO ELLIPTA 100-25 mcg/dose powder for inhalation  See Instructions, USE 1 INHALATION ORALLY    DAILY, # 180 gm, Refill(s) 3, Pharmacy: [facility], 163, 09/25/23 14:13:00 EDT, Height, cm, 101.6, 09/25/23 14:14:00 EDT, Dosing  Weight, kg  Generic drug: fluticasone furoate-vilanteroL     calcium-magnesium-zinc tablet  1 tablet 2 times     cevimeline 30 mg capsule  Commonly known as: EVOXAC  Take 30 mg by mouth 2 (two) times a day.  Dose: 30 mg     esomeprazole 20 mg capsule  Commonly known as: NexIUM  Take 20 mg by mouth daily before breakfast.  Dose: 20 mg     FLUoxetine 10 mg capsule  Commonly known as: PROzac  Take 60 mg by mouth daily.  Dose: 60 mg     ipratropium 42 mcg (0.06 %) nasal spray  Commonly known as: ATROVENT  2 sprays 2 times daily.  Dose: 2 spray     JANUVIA 50 mg tablet  Take 50 mg by mouth nightly.  Dose: 50 mg  Generic drug: SITagliptin phosphate     levocetirizine 5 mg tablet  Commonly known as: XYZAL  5 mg.  Dose: 5 mg     medical marijuana  Inhale 1 each See admin instr. Please be advised that an [facility] hospital staff member has listed medical marijuana as one of your home medications for documentation purposes. Please follow-up with your certified issuing provider for ongoing use  Dose: 1 each     metoprolol tartrate 25 mg tablet  Commonly known as: LOPRESSOR  Take 75 mg by mouth 2 (two) times a day.  Dose: 75 mg     montelukast 10 mg tablet  Commonly known as: SINGULAIR  Take by mouth nightly.     simvastatin 40 mg tablet  Commonly known as: ZOCOR  Take 40 mg by mouth nightly.  Dose: 40 mg     traZODone 50 mg tablet  Commonly known as: DESYREL  1 -3tab, Oral, qHS, # 270 tab, 3 Refill(s), Prescription Routing: Route to Pharmacy Electronically, Pharmacy: [facility], 163, 12/19/23 13:37:00 EST, Height, cm, 101.8, 12/19/23 13:38:00 EST, Dosing Weight, kg     VESICARE 10 mg tablet  10 mg.  Dose: 10 mg  Generic drug: solifenacin              Active Issues Requiring Follow-up  Issue:   Responsible Individual:   What is Needed:   Follow-up Appointments Arranged: Yes     Outpatient Follow-Up  Encounter Information    This patient does not currently have any appointments scheduled.           Test Results  Pending at Discharge  Unresulted Labs (From admission, onward)      None            DETAILS OF HOSPITAL STAY    Presenting Problem/History of Present Illness  Cervical spondylosis with myelopathy [M47.12]  Cervical spinal stenosis [M48.02]  Spinal stenosis [M48.00]      Hospital Course    69 y.o.  Female  presented to Surgeon(s):  Jad Rodriguez MD with signs of neck pain, after informed consent patient elected to undergo Procedure(s) (LRB):  C2-5 posterior cervical decompression and fusion instrumentation allograft autograft revision of existing instrumentation (N/A).    The patient tolerated the procedure well, there were no perioperative complications, see operative report for details.  The patient recovered as expected in the postoperative period.  At the time of discharge the patient was cleared by PT/OT, the medical consultant, pain was well controlled, tolerated a regular diet, and was voiding on spontaneously.  Patient was advised to seek medical attention for worsening pain, fevers, chest pain, shortness of breath, increased weakness to lower extremity, bowel/bladder changes/incontinence.    Daily dressing changes as instructed   Follow up with Surgeon(s):  Jad Rodriguez MD,  [facility]  in 2 weeks, call for appointment [phone]       Operative Procedures Performed  Procedure(s):  C2-5 posterior cervical decompression and fusion instrumentation allograft autograft revision of existing instrumentation  Consults: general medicine

## 2024-04-22 NOTE — OP NOTE
REPORT TYPE: Operative Note    DATE OF OPERATION: 04/19/2024    PREOPERATIVE DIAGNOSES:  C2-C3 stenosis, spinal cord compression, myelopathy, status post a posterior and anterior cervical fusion.    POSTOPERATIVE DIAGNOSES:  C2-C3 stenosis, spinal cord compression, myelopathy, status post a posterior and anterior cervical fusion.    PROCEDURES:  Bilateral cervical laminectomy C2-C3, posterior cervical fusion C2-C3 with posterior instrumentation C2 to C4 with use of local autograft bone, demineralized allograft bone matrix, application and removal Madrigal cranial tongs.    SURGEON:  Jad Rodriguez MD    ASSISTANT:  Roverto Merrill MD    ANESTHESIA:  General endotracheal anesthesia.    MEDICATIONS:    COMPLICATIONS:  None.    INSTRUMENTATION:  DePuy.    SPECIMENS:  None.    ESTIMATED BLOOD LOSS:  150 mL.    INDICATIONS:  For a complete discussion of indication of procedure as well as discussion I had with the patient in my office regarding risks, benefits and alternatives of treatment, please refer to my office notes and the consent forms, which were   signed.    DESCRIPTION OF PROCEDURE:  The patient was identified in the holding area.  Operative site was marked.  The patient was taken to the operating room.  Neurophysiologic monitoring leads were applied.  General anesthesia was administered.  Madrigal cranial   tongs were applied.  The patient was then prepped and draped in a prone position on a standard OR table with chest rolls.  All bony and soft tissue protuberances were well padded throughout the procedure.  Throughout the procedure, the patient had SCDs   on her bilateral lower extremities.  Prior to skin incision, intravenous antibiotics were administered and a formal timeout was performed.  At the end of procedure, sponge and needle counts were correct x2.    After prepping and draping, an incision was made posteriorly over the C2 to C4 levels in the midline.  Dissection was carried down through subcutaneous  tissue down to the level of the deep fascia.  Deep fascia was incised with electrocautery.  In   subperiosteal fashion, the posterior elements of C2 to C4 were exposed including the previous instrumentation at C4. Soft tissue retractors were placed.  A large rongeur was then used to remove the spinous processes of C2 and C3.  A high-speed bur was   then used to drill laminectomy troughs at the junction of the lamina and lateral masses and pars at C3 and C2.  Towel clips were then used to lift off the spinous processes and lamina of C2 and C3.  This bone was morcellized in a bone mill for later use   during the fusion portion of procedure.  Any remaining soft tissue attachments were removed using a #1 Kerrison rongeur.  Partial medial facetectomies were performed at C2-C3 using a #1 Kerrison rongeur.  A high-speed bur was then used to make starting   point for the pars screws bilaterally at C2, the lateral mass screws bilaterally at C3 and on the right side at C4.  A wedding band was placed on the end of the existing marium on the left side at the C4 level.  The screw holes were drilled and tapped and   felt with a ball-tipped probe to ensure there were no breaches.  Following this, screws of appropriate width and length were placed bilaterally in the above-mentioned levels.  Secure fixation was noted with each screw purchase.  Rods were cut and   contoured and placed in the screws bilaterally C3-C4 on the right side and C2-C3 as well as the wedding band on the left side.  Caps were placed over the rods into the screws and finally tightened in place according to the 's recommendations.    Intraoperative lateral x-ray showed adequate positioning of the instrumentation. The wound was copiously irrigated with antibiotic-impregnated solution for a total of 3 liters irrigation.  Hemostasis was obtained using electrocautery, bipolar and   FloSeal.  Under Valsalva maneuver, there was no significant bleeding noted.   A high-speed bur was then used to decorticate the posterolateral elements including the remaining lateral masses C3 and C2 and the C2-C3 facet joint.  Local autograft bone from   laminectomy that had been morcellized was combined with demineralized allograft bone matrix.  This bone graft combination was packed out of the decorticated bony surfaces bilaterally at C2-C3.  Hemostasis was felt to be adequate.  The decompression was   felt to be adequate.  A drain was placed deep to the deep fascia.  Deep fascia was closed in interrupted fashion.  Subcutaneous tissue was closed in interrupted fashion.  Skin was closed in running fashion followed by a dry dressing and tape.  The   patient was turned supine on the hospital bed, extubated and transferred to the PACU in stable condition.  There were no complications.      Jad Rodriguez MD    DD: 04/22/2024 17:18  DT: 04/22/2024 18:01  Voice ID: [id number]/Report ID: [id number]  am

## 2024-04-22 NOTE — PROGRESS NOTES
Pt without new complaints, doing well  AFVSS   5/5 motor BLE and BUE all muscle groups  SILT BLE and BUE all sens dist  Dressing dry     A/P stable post op day 3  -pt, oob  -pain control  -scds  -med management  -collar  -dc planning

## 2024-04-22 NOTE — PLAN OF CARE
Care Coordination Discharge Plan Summary    Admission Assessment Summary    General Information  Readmission Within the last 30 days: no previous admission in last 30 days  Does patient have a :    Patient-Specific Goals (include timeframe): pt goal to return to home    Living Arrangements  Arrived From: home  Current Living Arrangements: condominium  People in Home: spouse  Home Accessibility: wheelchair accessible  Living Arrangement Comments: Pt lives with spouse in 1st floor condo with no steps to enter.    Social Determinants of Health - Screenings  Housing Stability  In the last 12 months, was there a time when you were not able to pay the mortgage or rent on time?: No  In the last 12 months, how many places have you lived?: 1  In the last 12 months, was there a time when you did not have a steady place to sleep or slept in a shelter (including now)?: No  Utility Access  In the past 12 months has the electric, gas, oil, or water company threatened to shut off services in your home?: No  Transportation Needs  In the past 12 months, has lack of transportation kept you from medical appointments or from getting medications?: No  In the past 12 months, has lack of transportation kept you from meetings, work, or from getting things needed for daily living?: No    Functional Status Prior to Admission  Assistive Device/Animal Currently Used at Home: cane, straight, raised toilet, grab bar, shower chair (pt reports that she has a RW, sock aid, and reacher at home)  Functional Status Comments: Pt was reportedly independent with functional status prior to hopsital  IADL Comments: Pt was reportedly independent with IADLS prior to hospital    Discharge Needs Assessment    Concerns to be Addressed: care coordination/care conferences  Current Discharge Risk:    Anticipated Changes Related to Illness: none    Discharge Plan Summary    Patient Choice  Offered/Gave Vendor List: no  Patient's Choice of Community  Agency(s): N/A       Concerns / Comments: Met with patient at bedside.  IMM explained; signed by patient; declined copy.  For d/c home without services.  Will have a ride home.    Discharge Plan:  Disposition/Destination: Home  / Home       Connection to Community  Patient declined offered resources.  Community Resources:      Discharge Transportation:  Is Out of Hospital DNR needed at Discharge: no  Does patient need discharge transport? No

## 2025-04-10 ENCOUNTER — APPOINTMENT (OUTPATIENT)
Dept: URBAN - METROPOLITAN AREA CLINIC 203 | Age: 70
Setting detail: DERMATOLOGY
End: 2025-04-15

## 2025-04-10 DIAGNOSIS — L57.8 OTHER SKIN CHANGES DUE TO CHRONIC EXPOSURE TO NONIONIZING RADIATION: ICD-10-CM

## 2025-04-10 DIAGNOSIS — L29.89 OTHER PRURITUS: ICD-10-CM

## 2025-04-10 DIAGNOSIS — D22 MELANOCYTIC NEVI: ICD-10-CM

## 2025-04-10 DIAGNOSIS — L81.4 OTHER MELANIN HYPERPIGMENTATION: ICD-10-CM

## 2025-04-10 PROBLEM — D22.72 MELANOCYTIC NEVI OF LEFT LOWER LIMB, INCLUDING HIP: Status: ACTIVE | Noted: 2025-04-10

## 2025-04-10 PROCEDURE — OTHER COUNSELING: OTHER

## 2025-04-10 PROCEDURE — OTHER SUNSCREEN RECOMMENDATIONS: OTHER

## 2025-04-10 PROCEDURE — 99213 OFFICE O/P EST LOW 20 MIN: CPT

## 2025-04-10 PROCEDURE — OTHER PRESCRIPTION MEDICATION MANAGEMENT: OTHER

## 2025-04-10 ASSESSMENT — LOCATION DETAILED DESCRIPTION DERM
LOCATION DETAILED: LEFT PROXIMAL PRETIBIAL REGION
LOCATION DETAILED: RIGHT INFERIOR UPPER BACK
LOCATION DETAILED: SUPERIOR LUMBAR SPINE
LOCATION DETAILED: LEFT MEDIAL BREAST 10-11:00 REGION
LOCATION DETAILED: LEFT MEDIAL BREAST 11-12:00 REGION

## 2025-04-10 ASSESSMENT — LOCATION SIMPLE DESCRIPTION DERM
LOCATION SIMPLE: LEFT PRETIBIAL REGION
LOCATION SIMPLE: LOWER BACK
LOCATION SIMPLE: LEFT BREAST
LOCATION SIMPLE: RIGHT UPPER BACK

## 2025-04-10 ASSESSMENT — LOCATION ZONE DERM
LOCATION ZONE: TRUNK
LOCATION ZONE: LEG

## 2025-04-10 NOTE — PROCEDURE: PRESCRIPTION MEDICATION MANAGEMENT
Render In Strict Bullet Format?: No
Samples Given: Cerave itch cream and dermeleve
Detail Level: Zone

## 2025-04-10 NOTE — PROCEDURE: SUNSCREEN RECOMMENDATIONS

## 2025-05-14 ENCOUNTER — TELEPHONE (OUTPATIENT)
Dept: GASTROENTEROLOGY | Facility: CLINIC | Age: 70
End: 2025-05-14
Payer: MEDICARE

## (undated) DEVICE — PAD GROUND ELECTROSURGICAL W/CORD

## (undated) DEVICE — DRAPE ABDOMINAL MAJOR ST 8/CS

## (undated) DEVICE — SUTURE MONOSOF 3-0 BLACK 1X30 C-16

## (undated) DEVICE — Device

## (undated) DEVICE — DRESSING XEROFORM 2X2

## (undated) DEVICE — BLADE BONE MILL DISP MEDIUM

## (undated) DEVICE — DRAIN SINGLE ROUND W/TROCAR 3/16

## (undated) DEVICE — BIT DRILL12MM

## (undated) DEVICE — TUBING CELL SAVER

## (undated) DEVICE — DRESSING SPONGE ALL GAUZE 4X4 STER 10PK

## (undated) DEVICE — MANIFOLD FOUR PORT NEPTUNE

## (undated) DEVICE — TAPE MICROFOAM  3IN 4 RL-BX

## (undated) DEVICE — SUTURE MONOSOF 2-0 BLACK 1X18 C-15

## (undated) DEVICE — RESERVIOR COLLECTION

## (undated) DEVICE — PACK SPINE LUMBAR

## (undated) DEVICE — TAPE ADHESIVE 3IN

## (undated) DEVICE — POUCH INSTRUMENT 7X11 3-4

## (undated) DEVICE — TIP BOVIE EXTENSION REUSABLE 4IN

## (undated) DEVICE — COVER MAYO STAND XLARGE 30¿ X 57¿

## (undated) DEVICE — STRAP POSITIONING 5X72" ONE PIECE DISP

## (undated) DEVICE — GOWN SIRUS NONRNF RAGLAN XL ST 30/CS

## (undated) DEVICE — *T* 3.0MM MATCH HEAD PRECISION NEURO BUR

## (undated) DEVICE — SOLN DURAPREP WAND

## (undated) DEVICE — SUTURE POLYSORB 1 UNDYED 1X30 GS-12

## (undated) DEVICE — SET AUTOTRANSFUSION AT-1

## (undated) DEVICE — SUTURE POLYSORB 2-0 UNDYED 1X30 GS-10

## (undated) DEVICE — EVACUATOR CLOSED SUCTION 100C

## (undated) DEVICE — GLOVE SZ 7.5 LINER PROTEXIS PI BL

## (undated) DEVICE — TRAY URINE METER FOLEY NON-SILVER

## (undated) DEVICE — PACK SET UP NO DRAPE 12/CS

## (undated) DEVICE — GAUZE XEROFORM 1X8 NON-STERILE PACKET

## (undated) DEVICE — ELECTROSURGICAL TIP CLEANER

## (undated) DEVICE — SYSTEM LABELING CORRECT MEDICATION

## (undated) DEVICE — TAP 3.0MM

## (undated) DEVICE — FORCEP BAYONET SLM 23CM X 1.5MM DISP

## (undated) DEVICE — GLOVE PROTEXIS PI ORTHO 7.5

## (undated) DEVICE — HANDLE LIGHT COVER STERILE

## (undated) DEVICE — *T* 1.7MM MATCH HEAD PRECISION NEURO BUR

## (undated) DEVICE — SURGIFLO HEMOSTATIC MATRIX 8ML

## (undated) DEVICE — PENEVAC1 NONSTICK SMOKE EVAC

## (undated) DEVICE — PINS SKULL DISPOSABLE